# Patient Record
Sex: FEMALE | Race: WHITE | NOT HISPANIC OR LATINO
[De-identification: names, ages, dates, MRNs, and addresses within clinical notes are randomized per-mention and may not be internally consistent; named-entity substitution may affect disease eponyms.]

---

## 2017-02-22 ENCOUNTER — APPOINTMENT (OUTPATIENT)
Dept: PULMONOLOGY | Facility: CLINIC | Age: 57
End: 2017-02-22

## 2017-02-22 VITALS
HEIGHT: 66 IN | SYSTOLIC BLOOD PRESSURE: 110 MMHG | HEART RATE: 98 BPM | DIASTOLIC BLOOD PRESSURE: 80 MMHG | BODY MASS INDEX: 31.34 KG/M2 | RESPIRATION RATE: 14 BRPM | WEIGHT: 195 LBS | OXYGEN SATURATION: 98 %

## 2017-02-22 DIAGNOSIS — Z87.39 PERSONAL HISTORY OF OTHER DISEASES OF THE MUSCULOSKELETAL SYSTEM AND CONNECTIVE TISSUE: ICD-10-CM

## 2017-02-22 RX ORDER — DIAZEPAM 10 MG/1
10 TABLET ORAL
Qty: 90 | Refills: 0 | Status: ACTIVE | COMMUNITY
Start: 2016-11-23

## 2017-02-22 RX ORDER — OXYCODONE AND ACETAMINOPHEN 5; 325 MG/1; MG/1
5-325 TABLET ORAL
Qty: 16 | Refills: 0 | Status: DISCONTINUED | COMMUNITY
Start: 2016-11-09 | End: 2017-02-22

## 2017-02-22 RX ORDER — ONDANSETRON 8 MG/1
8 TABLET ORAL
Qty: 30 | Refills: 0 | Status: ACTIVE | COMMUNITY
Start: 2016-11-14

## 2017-02-22 RX ORDER — NARATRIPTAN 2.5 MG/1
2.5 TABLET, FILM COATED ORAL
Qty: 4 | Refills: 0 | Status: ACTIVE | COMMUNITY
Start: 2016-09-06

## 2017-02-22 RX ORDER — VALACYCLOVIR 500 MG/1
500 TABLET, FILM COATED ORAL
Qty: 60 | Refills: 0 | Status: ACTIVE | COMMUNITY
Start: 2016-07-25

## 2017-02-22 RX ORDER — BUDESONIDE 180 UG/1
180 AEROSOL, POWDER RESPIRATORY (INHALATION) TWICE DAILY
Qty: 3 | Refills: 1 | Status: ACTIVE | COMMUNITY
Start: 2017-02-22 | End: 1900-01-01

## 2017-02-22 RX ORDER — VERAPAMIL HYDROCHLORIDE 180 MG/1
180 TABLET ORAL
Qty: 30 | Refills: 0 | Status: ACTIVE | COMMUNITY
Start: 2017-01-25 | End: 1900-01-01

## 2017-02-22 RX ORDER — ERGOCALCIFEROL 1.25 MG/1
1.25 MG CAPSULE, LIQUID FILLED ORAL
Qty: 4 | Refills: 0 | Status: ACTIVE | COMMUNITY
Start: 2016-09-23

## 2017-02-22 RX ORDER — MUPIROCIN 20 MG/G
2 OINTMENT TOPICAL
Qty: 22 | Refills: 0 | Status: ACTIVE | COMMUNITY
Start: 2016-12-01

## 2017-02-22 RX ORDER — LISINOPRIL 10 MG/1
10 TABLET ORAL
Qty: 30 | Refills: 0 | Status: ACTIVE | COMMUNITY
Start: 2016-09-28

## 2017-02-22 RX ORDER — CEPHALEXIN 500 MG/1
500 CAPSULE ORAL
Qty: 28 | Refills: 0 | Status: DISCONTINUED | COMMUNITY
Start: 2016-11-23 | End: 2017-02-22

## 2017-02-22 RX ORDER — HYDROXYCHLOROQUINE SULFATE 200 MG/1
200 TABLET, FILM COATED ORAL
Qty: 60 | Refills: 0 | Status: ACTIVE | COMMUNITY
Start: 2017-01-25

## 2017-02-22 RX ORDER — VALACYCLOVIR 1 G/1
1 TABLET, FILM COATED ORAL
Qty: 28 | Refills: 0 | Status: ACTIVE | COMMUNITY
Start: 2016-12-16

## 2017-03-02 ENCOUNTER — TRANSCRIPTION ENCOUNTER (OUTPATIENT)
Age: 57
End: 2017-03-02

## 2017-03-20 ENCOUNTER — MEDICATION RENEWAL (OUTPATIENT)
Age: 57
End: 2017-03-20

## 2017-04-24 ENCOUNTER — APPOINTMENT (OUTPATIENT)
Dept: PULMONOLOGY | Facility: CLINIC | Age: 57
End: 2017-04-24

## 2017-04-24 VITALS
WEIGHT: 195 LBS | BODY MASS INDEX: 31.34 KG/M2 | HEART RATE: 75 BPM | HEIGHT: 66 IN | DIASTOLIC BLOOD PRESSURE: 70 MMHG | SYSTOLIC BLOOD PRESSURE: 110 MMHG | RESPIRATION RATE: 14 BRPM | OXYGEN SATURATION: 99 %

## 2017-04-24 DIAGNOSIS — Z87.09 PERSONAL HISTORY OF OTHER DISEASES OF THE RESPIRATORY SYSTEM: ICD-10-CM

## 2017-04-24 RX ORDER — SUCRALFATE 1 G/1
1 TABLET ORAL
Qty: 120 | Refills: 3 | Status: ACTIVE | COMMUNITY
Start: 2017-04-24 | End: 1900-01-01

## 2017-07-11 ENCOUNTER — APPOINTMENT (OUTPATIENT)
Dept: PULMONOLOGY | Facility: CLINIC | Age: 57
End: 2017-07-11

## 2017-07-11 VITALS
WEIGHT: 195 LBS | RESPIRATION RATE: 17 BRPM | HEART RATE: 93 BPM | SYSTOLIC BLOOD PRESSURE: 120 MMHG | HEIGHT: 66 IN | BODY MASS INDEX: 31.34 KG/M2 | OXYGEN SATURATION: 98 % | DIASTOLIC BLOOD PRESSURE: 75 MMHG

## 2017-07-11 RX ORDER — DULOXETINE HYDROCHLORIDE 60 MG/1
60 CAPSULE, DELAYED RELEASE PELLETS ORAL
Qty: 30 | Refills: 0 | Status: ACTIVE | COMMUNITY
Start: 2017-04-04

## 2017-07-11 RX ORDER — CEFDINIR 300 MG/1
300 CAPSULE ORAL
Qty: 20 | Refills: 0 | Status: DISCONTINUED | COMMUNITY
Start: 2017-02-22 | End: 2017-07-11

## 2017-07-11 RX ORDER — DEXTROAMPHETAMINE SACCHARATE, AMPHETAMINE ASPARTATE, DEXTROAMPHETAMINE SULFATE AND AMPHETAMINE SULFATE 5; 5; 5; 5 MG/1; MG/1; MG/1; MG/1
20 TABLET ORAL
Qty: 60 | Refills: 0 | Status: ACTIVE | COMMUNITY
Start: 2017-03-28

## 2017-07-11 RX ORDER — CLONAZEPAM 1 MG/1
1 TABLET ORAL
Qty: 60 | Refills: 0 | Status: DISCONTINUED | COMMUNITY
Start: 2017-01-25 | End: 2017-07-11

## 2017-07-11 RX ORDER — HYDROCODONE BITARTRATE AND ACETAMINOPHEN 5; 325 MG/1; MG/1
5-325 TABLET ORAL
Qty: 18 | Refills: 0 | Status: ACTIVE | COMMUNITY
Start: 2017-04-24

## 2017-07-11 RX ORDER — BUTALBITAL, ACETAMINOPHEN AND CAFFEINE 50; 325; 40 MG/1; MG/1; MG/1
50-325-40 CAPSULE ORAL
Qty: 180 | Refills: 0 | Status: ACTIVE | COMMUNITY
Start: 2017-03-07

## 2017-07-11 RX ORDER — CLONAZEPAM 2 MG/1
2 TABLET ORAL
Qty: 60 | Refills: 0 | Status: ACTIVE | COMMUNITY
Start: 2017-03-28

## 2017-07-24 ENCOUNTER — APPOINTMENT (OUTPATIENT)
Dept: PULMONOLOGY | Facility: CLINIC | Age: 57
End: 2017-07-24

## 2017-07-24 VITALS
HEART RATE: 82 BPM | OXYGEN SATURATION: 100 % | RESPIRATION RATE: 17 BRPM | BODY MASS INDEX: 32.14 KG/M2 | WEIGHT: 200 LBS | HEIGHT: 66 IN | SYSTOLIC BLOOD PRESSURE: 104 MMHG | DIASTOLIC BLOOD PRESSURE: 70 MMHG

## 2017-07-24 RX ORDER — HALOBETASOL PROPIONATE 0.5 MG/G
0.05 OINTMENT TOPICAL
Qty: 15 | Refills: 0 | Status: ACTIVE | COMMUNITY
Start: 2017-07-21

## 2017-08-15 ENCOUNTER — RX RENEWAL (OUTPATIENT)
Age: 57
End: 2017-08-15

## 2017-09-11 ENCOUNTER — MEDICATION RENEWAL (OUTPATIENT)
Age: 57
End: 2017-09-11

## 2017-09-12 ENCOUNTER — APPOINTMENT (OUTPATIENT)
Dept: PULMONOLOGY | Facility: CLINIC | Age: 57
End: 2017-09-12

## 2018-03-03 ENCOUNTER — RX RENEWAL (OUTPATIENT)
Age: 58
End: 2018-03-03

## 2018-05-06 ENCOUNTER — TRANSCRIPTION ENCOUNTER (OUTPATIENT)
Age: 58
End: 2018-05-06

## 2018-06-01 ENCOUNTER — TRANSCRIPTION ENCOUNTER (OUTPATIENT)
Age: 58
End: 2018-06-01

## 2018-07-10 ENCOUNTER — APPOINTMENT (OUTPATIENT)
Dept: PULMONOLOGY | Facility: CLINIC | Age: 58
End: 2018-07-10
Payer: COMMERCIAL

## 2018-07-10 VITALS
BODY MASS INDEX: 32.14 KG/M2 | RESPIRATION RATE: 15 BRPM | OXYGEN SATURATION: 95 % | SYSTOLIC BLOOD PRESSURE: 110 MMHG | DIASTOLIC BLOOD PRESSURE: 74 MMHG | WEIGHT: 200 LBS | HEART RATE: 101 BPM | HEIGHT: 66 IN

## 2018-07-10 PROCEDURE — 94729 DIFFUSING CAPACITY: CPT

## 2018-07-10 PROCEDURE — 99214 OFFICE O/P EST MOD 30 MIN: CPT | Mod: 25

## 2018-07-10 PROCEDURE — 94010 BREATHING CAPACITY TEST: CPT

## 2018-07-10 RX ORDER — AMOXICILLIN AND CLAVULANATE POTASSIUM 500; 125 MG/1; MG/1
500-125 TABLET, FILM COATED ORAL
Qty: 30 | Refills: 0 | Status: DISCONTINUED | COMMUNITY
Start: 2018-02-28

## 2018-07-10 RX ORDER — TRAMADOL HYDROCHLORIDE 50 MG/1
50 TABLET, COATED ORAL
Qty: 40 | Refills: 0 | Status: ACTIVE | COMMUNITY
Start: 2018-01-23

## 2018-07-10 RX ORDER — ONDANSETRON 4 MG/1
4 TABLET, ORALLY DISINTEGRATING ORAL
Qty: 40 | Refills: 0 | Status: ACTIVE | COMMUNITY
Start: 2018-01-16

## 2018-07-10 RX ORDER — ONDANSETRON 8 MG/1
8 TABLET, ORALLY DISINTEGRATING ORAL
Qty: 30 | Refills: 0 | Status: ACTIVE | COMMUNITY
Start: 2018-04-13

## 2018-07-10 RX ORDER — ERYTHROMYCIN 5 MG/G
5 OINTMENT OPHTHALMIC
Qty: 4 | Refills: 0 | Status: DISCONTINUED | COMMUNITY
Start: 2018-05-06

## 2018-07-10 RX ORDER — GABAPENTIN 300 MG/1
300 CAPSULE ORAL
Qty: 90 | Refills: 0 | Status: ACTIVE | COMMUNITY
Start: 2018-06-08

## 2018-08-15 ENCOUNTER — MEDICATION RENEWAL (OUTPATIENT)
Age: 58
End: 2018-08-15

## 2018-09-22 ENCOUNTER — RX RENEWAL (OUTPATIENT)
Age: 58
End: 2018-09-22

## 2018-10-21 ENCOUNTER — TRANSCRIPTION ENCOUNTER (OUTPATIENT)
Age: 58
End: 2018-10-21

## 2018-10-30 ENCOUNTER — NON-APPOINTMENT (OUTPATIENT)
Age: 58
End: 2018-10-30

## 2018-10-30 ENCOUNTER — APPOINTMENT (OUTPATIENT)
Dept: PULMONOLOGY | Facility: CLINIC | Age: 58
End: 2018-10-30
Payer: COMMERCIAL

## 2018-10-30 VITALS
DIASTOLIC BLOOD PRESSURE: 70 MMHG | OXYGEN SATURATION: 97 % | HEART RATE: 100 BPM | SYSTOLIC BLOOD PRESSURE: 120 MMHG | BODY MASS INDEX: 32.14 KG/M2 | RESPIRATION RATE: 17 BRPM | HEIGHT: 66 IN | WEIGHT: 200 LBS

## 2018-10-30 PROCEDURE — 94010 BREATHING CAPACITY TEST: CPT

## 2018-10-30 PROCEDURE — 99214 OFFICE O/P EST MOD 30 MIN: CPT | Mod: 25

## 2018-11-07 ENCOUNTER — APPOINTMENT (OUTPATIENT)
Dept: PULMONOLOGY | Facility: CLINIC | Age: 58
End: 2018-11-07
Payer: COMMERCIAL

## 2018-11-07 ENCOUNTER — NON-APPOINTMENT (OUTPATIENT)
Age: 58
End: 2018-11-07

## 2018-11-07 VITALS
DIASTOLIC BLOOD PRESSURE: 62 MMHG | WEIGHT: 212 LBS | BODY MASS INDEX: 34.07 KG/M2 | OXYGEN SATURATION: 95 % | RESPIRATION RATE: 17 BRPM | HEIGHT: 66 IN | SYSTOLIC BLOOD PRESSURE: 114 MMHG | HEART RATE: 88 BPM

## 2018-11-07 PROCEDURE — 94010 BREATHING CAPACITY TEST: CPT

## 2018-11-07 PROCEDURE — 99214 OFFICE O/P EST MOD 30 MIN: CPT | Mod: 25

## 2018-11-07 NOTE — PROCEDURE
[FreeTextEntry1] : PFT - spi reveals normal flows; FEV1 is 3.11 which is 110% of predicted, normal flow volume loop

## 2018-11-07 NOTE — HISTORY OF PRESENT ILLNESS
[FreeTextEntry1] : Ms. Dickinson is a 57 year old female presenting to the office for a follow up visit for asthma, allergies, GERD, JOSHUA, PNA history, poor sleep, postnasal drip, sicca, sleep disordered breathing, and shortness of breath. Her chief complaint is her ankle pain.\par -she states that she has been experiencing an residual cough\par -she reports some dizziness after coughing\par -she notes she has a slight sour taste in the mouth\par -she complains of active heartburn since her last illness\par -she denies any recent wheezing \par -she experiences SOB after exercising. She notes that her ankle is swollen and is going to get  it evaluated\par -she states that her mouth still has dryness\par -she reports that she takes Gabapentin for her pain instead of Percocets \par -she denies any headaches, nausea, vomiting, fever, chills, sweats, chest pain, chest pressure, diarrhea, constipation, dysphagia, leg swelling, leg pain, itchy eyes, itchy ears.

## 2018-11-07 NOTE — ADDENDUM
[FreeTextEntry1] : \par All medical record entries made by nguyen Viramontes were at Dr. Len Gonzalez's, direction and personally dictated by me on (11/7/2018). I have reviewed the chart and agree that the record accurately reflects my personal performance of the history, physical exam, assessment and plan. I have also personally directed, reviewed, and agree with the discharge instructions.

## 2018-11-07 NOTE — REASON FOR VISIT
[Follow-Up] : a follow-up visit [FreeTextEntry1] : asthma, allergies, GERD, JOSHUA, PNA history, poor sleep, postnasal drip, sicca, sleep disordered breathing, and shortness of breath

## 2018-11-07 NOTE — ASSESSMENT
[FreeTextEntry1] : Ms. Dickinson has a h/o asthma, allergy, GERS, OSAS, s/p PNA. She is overall improved.\par \par Her shortness of breath is multifactorial due to:\par -obesity\par -out of shape\par -asthma\par -? CAD\par -poor breathing mechanics\par \par Problem 1: PNA\par -finished Doxycycline 100 BID x 7mg\par \par problem 1: asthma-\par -continue to use Singulair 10 mg before bed\par -continue Xopenex 1.25 with the nebulizer TID\par -Continue Asmanex inhaler 2 inhalations BID\par - Continue  Bevespi 2 puffs BID \par -Asthma is believed to be caused by inherited (genetic) and environmental factor, but its exact cause is unknown. Asthma may be triggered by allergens, lung infections, or irritants in the air. Asthma triggers are different for each person\par -Inhaler technique reviewed as well as oral hygiene techniques reviewed with patient. Avoidance of cold air, extremes of temperature, rescue inhaler should be used before exercise. Order of medication reviewed with patient. Recommended use of a cool mist humidifier in the bedroom. \par \par problem 2: allergic rhinitis \par - Recommended Navage saline \par -continue to use nasal saline\par -followed by Flonase 1 sniff each nostril BID \par -followed by Olopatadine 1 sniff each nostril\par -can use Boroleum ointment PRN\par -X-lear QD PRN \par -Environmental measures for allergies were encouraged including mattress and pillow cover, air purifier, and environmental controls.\par \par problem 3: GERD\par -continue to use Dexilant 60 mg BID (before meals)\par -continue Pepcid 40 mg before bed \par -restart use of Carafate PRN\par -discussed Rule of 2's; pt should avoid eating too much; too fast; too spicy; too lousy; less than two hours before bed \par -Things to avoid including overeating, spicy foods, tight clothing, eating within three hours of bed, this list is not all inclusive. \par -For treatment of reflux, possible options discussed including diet control, H2 blockers, PPIs, as well as coating motility agents discussed as treatment options. Timing of meals and proximity of last meal to sleep were discussed. If symptoms persist, a formal gastrointestinal evaluation is needed.\par \par problem 4: obesity\par -Weight loss, exercise, and diet control were discussed and are highly encouraged. Treatment options were given such as, aqua therapy, and contacting a nutritionist. Recommended to use the elliptical, stationary bike, less use of treadmill. Obesity is associated with worsening asthma, shortness of breath, and potential for cardiac disease, diabetes, and other underlying medical conditions. \par \par problem 5: mild JOSHUA\par -her recent sleep study found mild OSAS- recommended a Dental Device, the names Dr. Roblero is recommended for her based on her location \par -Sleep apnea is associated with adverse clinical consequences which an affect most organ systems. Cardiovascular disease risk includes arrhythmias, atrial fibrillation, hypertension, coronary artery disease, and stroke. Metabolic disorders include diabetes type 2, non-alcoholic fatty liver disease. Mood disorder especially depression; and cognitive decline especially in the elderly. Associations with chronic reflux/Chavis’s esophagus some but not all inclusive.\par -Reasons include arousal consistent with hypopnea; respiratory events most prominent in REM sleep or supine position; therefore sleep staging and body position are important for accurate diagnosis and estimation of AHI.\par -Treatment options discussed including CPAP/BiPAP machine, oral appliance, ProVent therapy, Oxy-Aid by Respitec, new technologies, or positional sleep.Recommended use of the CPAP machine for moderate (AHI >15), moderate to severe (AHI 15-30) and severe patients (AHI > 30). Recommended weight loss which can reduce AHI especially in weight loss of greater than 5% of BMI. Positional sleep is recommended in those with low AHI, low-moderate MBI, and younger age. For severe sleep apnea, the hypoglossal nerve stimulator was recommended as well. \par \par problem 6: poor sleep\par - Recommended either Dr. Herbie Roblero or Dr. Yamila Albarran for a dental device. \par -recommending Sleep Guard\par -recommending pt wear sunglasses 30min before bed\par -Good sleep hygiene was encouraged including avoiding watching television an hour before bed, keeping caffeine at a low, avoiding reading, television, or anything, in bed, no drinking any liquids three hours before bedtime, and only getting into bed when tired and ready for sleep\par \par problem 7: sicca\par -recommended to use Mouth Kote \par \par Problem 8:health maintenance \par - Recommendd Functional medicine follow up with Dr. Sejal Myrick\par -s/p flu shot \par -recommended strep pneumonia vaccines: Prevnar-13 vaccine, followed by Pneumo vaccine 23 one year following\par -recommended early intervention for URIs\par -recommended regular osteoporosis evaluations\par -recommended early dermatological evaluations\par -recommended after the age of 50 to the age of 70, colonoscopy every 5 years \par F/U in 4 months\par She is encouraged to call with any changes, concerns, or questions.

## 2019-01-28 ENCOUNTER — RX RENEWAL (OUTPATIENT)
Age: 59
End: 2019-01-28

## 2019-03-07 ENCOUNTER — APPOINTMENT (OUTPATIENT)
Dept: PULMONOLOGY | Facility: CLINIC | Age: 59
End: 2019-03-07

## 2019-05-06 ENCOUNTER — TRANSCRIPTION ENCOUNTER (OUTPATIENT)
Age: 59
End: 2019-05-06

## 2019-07-25 ENCOUNTER — APPOINTMENT (OUTPATIENT)
Dept: PULMONOLOGY | Facility: CLINIC | Age: 59
End: 2019-07-25
Payer: COMMERCIAL

## 2019-07-25 ENCOUNTER — NON-APPOINTMENT (OUTPATIENT)
Age: 59
End: 2019-07-25

## 2019-07-25 VITALS
HEART RATE: 82 BPM | DIASTOLIC BLOOD PRESSURE: 65 MMHG | SYSTOLIC BLOOD PRESSURE: 110 MMHG | WEIGHT: 185 LBS | RESPIRATION RATE: 15 BRPM | OXYGEN SATURATION: 96 % | HEIGHT: 66 IN | BODY MASS INDEX: 29.73 KG/M2

## 2019-07-25 DIAGNOSIS — Z87.01 PERSONAL HISTORY OF PNEUMONIA (RECURRENT): ICD-10-CM

## 2019-07-25 DIAGNOSIS — H10.13 ACUTE ATOPIC CONJUNCTIVITIS, BILATERAL: ICD-10-CM

## 2019-07-25 PROCEDURE — 99214 OFFICE O/P EST MOD 30 MIN: CPT | Mod: 25

## 2019-07-25 PROCEDURE — 94010 BREATHING CAPACITY TEST: CPT

## 2019-11-25 ENCOUNTER — RX RENEWAL (OUTPATIENT)
Age: 59
End: 2019-11-25

## 2019-11-25 NOTE — PROCEDURE
[FreeTextEntry1] : PFT revealed normal flows, with a FEV1 of 3.30L, which is 118% of predicted, with a normal flow volume loop

## 2019-11-25 NOTE — ASSESSMENT
[FreeTextEntry1] : Ms. Dickinson has a h/o asthma, allergy, GERS, OSAS, s/p PNA, SLE / Sicca. She is overall improved.\par \par Her shortness of breath is multifactorial due to:\par -obesity\par -out of shape\par -asthma\par -? CAD\par -poor breathing mechanics\par \par problem 1: asthma-\par -continue to use Singulair 10 mg before bed\par -continue Xopenex 1.25 with the nebulizer TID\par -Add Trelegy 1 inhalation QD\par -Asthma is believed to be caused by inherited (genetic) and environmental factor, but its exact cause is unknown. Asthma may be triggered by allergens, lung infections, or irritants in the air. Asthma triggers are different for each person\par -Inhaler technique reviewed as well as oral hygiene techniques reviewed with patient. Avoidance of cold air, extremes of temperature, rescue inhaler should be used before exercise. Order of medication reviewed with patient. Recommended use of a cool mist humidifier in the bedroom. \par \par problem 2: allergic rhinitis \par - Recommended Navage saline \par -continue to use nasal saline\par -followed by Flonase 1 sniff each nostril BID \par -followed by Olopatadine 1 sniff each nostril\par -can use Boroleum ointment PRN\par -X-lear QD PRN \par -Environmental measures for allergies were encouraged including mattress and pillow cover, air purifier, and environmental controls.\par \par problem 3: GERD\par -continue to use Dexilant 60 mg BID (before meals)\par -continue Pepcid 40 mg before bed \par -restart use of Carafate PRN\par -discussed Rule of 2's; pt should avoid eating too much; too fast; too spicy; too lousy; less than two hours before bed \par -Things to avoid including overeating, spicy foods, tight clothing, eating within three hours of bed, this list is not all inclusive. \par -For treatment of reflux, possible options discussed including diet control, H2 blockers, PPIs, as well as coating motility agents discussed as treatment options. Timing of meals and proximity of last meal to sleep were discussed. If symptoms persist, a formal gastrointestinal evaluation is needed.\par \par problem 4: obesity (improved)\par -Weight loss, exercise, and diet control were discussed and are highly encouraged. Treatment options were given such as, aqua therapy, and contacting a nutritionist. Recommended to use the elliptical, stationary bike, less use of treadmill. Obesity is associated with worsening asthma, shortness of breath, and potential for cardiac disease, diabetes, and other underlying medical conditions. \par \par problem 5: mild JOSHUA\par -her recent sleep study found mild OSAS- recommended a Dental Device, the names Dr. Roblero is recommended for her based on her location \par -Sleep apnea is associated with adverse clinical consequences which an affect most organ systems. Cardiovascular disease risk includes arrhythmias, atrial fibrillation, hypertension, coronary artery disease, and stroke. Metabolic disorders include diabetes type 2, non-alcoholic fatty liver disease. Mood disorder especially depression; and cognitive decline especially in the elderly. Associations with chronic reflux/Chavis’s esophagus some but not all inclusive.\par -Reasons include arousal consistent with hypopnea; respiratory events most prominent in REM sleep or supine position; therefore sleep staging and body position are important for accurate diagnosis and estimation of AHI.\par -Treatment options discussed including CPAP/BiPAP machine, oral appliance, ProVent therapy, Oxy-Aid by Respitec, new technologies, or positional sleep.Recommended use of the CPAP machine for moderate (AHI >15), moderate to severe (AHI 15-30) and severe patients (AHI > 30). Recommended weight loss which can reduce AHI especially in weight loss of greater than 5% of BMI. Positional sleep is recommended in those with low AHI, low-moderate MBI, and younger age. For severe sleep apnea, the hypoglossal nerve stimulator was recommended as well. \par \par problem 6: poor sleep\par - Recommended either Dr. Herbie Roblero or Dr. Yamila Albarran for a dental device. \par -recommending Sleep Guard\par -recommending pt wear sunglasses 30min before bed\par -Good sleep hygiene was encouraged including avoiding watching television an hour before bed, keeping caffeine at a low, avoiding reading, television, or anything, in bed, no drinking any liquids three hours before bedtime, and only getting into bed when tired and ready for sleep\par \par problem 7: sicca\par -recommended to use Mouth Kote / Evoxac 30 mg QAM\par \par Problem 8:health maintenance \par - Recommended Functional medicine follow up with Dr. Svitlana Myrick\par -s/p flu shot \par -recommended strep pneumonia vaccines: Prevnar-13 vaccine, followed by Pneumo vaccine 23 one year following\par -recommended early intervention for URIs\par -recommended regular osteoporosis evaluations\par -recommended early dermatological evaluations\par -recommended after the age of 50 to the age of 70, colonoscopy every 5 years \par F/U in 4 months\par She is encouraged to call with any changes, concerns, or questions.

## 2019-11-25 NOTE — HISTORY OF PRESENT ILLNESS
[FreeTextEntry1] : Ms. Dickinson is a 58 year old female presenting to the office for a follow up visit for asthma, allergies, GERD, JOSHUA, PNA history, poor sleep, postnasal drip, sicca, sleep disordered breathing, and shortness of breath. Her chief complaint is SOB\par -she reports she was feeling well until the weather increased dramatically last week\par -she notes she hasn’t been breathing well, and needs to use her inhalers more often\par -she reports heavy breathing and a need to clear her throat due to post nasal drip onset 2 days ago\par -she notes she has SOB at night, and coughs up until she uses Xopenex\par -she reports not having a cold and doesn’t feel sick\par -she notes she has difficulty initiating sleep, though is able to maintain sleep well\par -she reports having lost 30 pounds\par -she reports her heartburn is controlled heavily\par -she reports feeling a nauseous sensation / reflux for the past few weeks and has been using Reglan for it, for which she doesn’t know the cause of this reflux\par -she reports she hasn’t been snoring\par -she notes her mouth is still dry\par -\par -she denies any chest pain, chest pressure, diarrhea, constipation, dysphagia, dizziness, sour taste in the mouth

## 2019-11-25 NOTE — ADDENDUM
[FreeTextEntry1] : Documented by Jasen Lemus acting as a scribe for Dr. Len Gonzalez on 07/25/2019.\par \par All medical record entries made by the Scribe were at my, Dr. Len Gonzalez's, direction and personally dictated by me on 07/25/2019. I have reviewed the chart and agree that the record accurately reflects my personal performance of the history, physical exam, assessment and plan. I have also personally directed, reviewed, and agree with the discharge instructions.

## 2019-11-25 NOTE — REVIEW OF SYSTEMS
[Negative] : Pulmonary Hypertension [Recent Wt Loss (___ Lbs)] : recent [unfilled] ~Ulb weight loss [Postnasal Drip] : postnasal drip [Cough] : cough [Sputum] : sputum  [Dry Mouth] : dry mouth [Dyspnea] : dyspnea [As Noted in HPI] : as noted in HPI [Difficulty Initiating Sleep] : difficulty falling asleep [Difficulty Maintaining Sleep] : no difficulty maintaining sleep [Snoring] : no snoring

## 2019-11-26 ENCOUNTER — TRANSCRIPTION ENCOUNTER (OUTPATIENT)
Age: 59
End: 2019-11-26

## 2020-01-21 ENCOUNTER — RX RENEWAL (OUTPATIENT)
Age: 60
End: 2020-01-21

## 2020-03-11 ENCOUNTER — APPOINTMENT (OUTPATIENT)
Dept: PULMONOLOGY | Facility: CLINIC | Age: 60
End: 2020-03-11

## 2020-03-22 ENCOUNTER — TRANSCRIPTION ENCOUNTER (OUTPATIENT)
Age: 60
End: 2020-03-22

## 2020-03-24 ENCOUNTER — RX RENEWAL (OUTPATIENT)
Age: 60
End: 2020-03-24

## 2020-03-29 ENCOUNTER — RX RENEWAL (OUTPATIENT)
Age: 60
End: 2020-03-29

## 2020-07-08 ENCOUNTER — APPOINTMENT (OUTPATIENT)
Dept: PULMONOLOGY | Facility: CLINIC | Age: 60
End: 2020-07-08
Payer: COMMERCIAL

## 2020-07-08 PROCEDURE — 99214 OFFICE O/P EST MOD 30 MIN: CPT | Mod: 95

## 2020-07-08 RX ORDER — RANITIDINE 300 MG/1
300 TABLET ORAL
Qty: 1 | Refills: 1 | Status: DISCONTINUED | COMMUNITY
Start: 2017-02-22 | End: 2020-07-08

## 2020-07-08 NOTE — REASON FOR VISIT
[Follow-Up] : a follow-up visit [FreeTextEntry1] : video - asthma, allergies, GERD, JOSHUA, PNA history, poor sleep, postnasal drip, sicca, sleep disordered breathing, and shortness of breath

## 2020-07-08 NOTE — ASSESSMENT
[FreeTextEntry1] : Ms. Dickinson is 58 yo has a h/o asthma, iron deficiency anemia,  allergy, GERS, OSAS, s/p PNA, SLE / Sicca. She video calls into the office for a pulmonary follow up. She is symptomatic due to asthma/ anemia.\par \par Her shortness of breath is multifactorial due to:\par -obesity\par -out of shape\par -asthma\par -? CAD\par -poor breathing mechanics (Anemia)\par \par problem 1: asthma\par -continue to use Singulair 10 mg before bed\par -continue Xopenex 1.25 with the nebulizer TID\par -Add Ventolin rescue inhaler 2 inhalations before exercise, Q6H \par -add Symbicort (160) 2 inhalations BID\par -Asthma is believed to be caused by inherited (genetic) and environmental factor, but its exact cause is unknown. Asthma may be triggered by allergens, lung infections, or irritants in the air. Asthma triggers are different for each person\par -Inhaler technique reviewed as well as oral hygiene techniques reviewed with patient. Avoidance of cold air, extremes of temperature, rescue inhaler should be used before exercise. Order of medication reviewed with patient. Recommended use of a cool mist humidifier in the bedroom. \par \par problem 1A: Fe Anemia\par -f/u Dr. Boyce for infusion\par \par problem 2: allergic rhinitis \par -add Astelin (.15) 1 sniff each nostril BID \par - Recommended Navage saline \par -continue to use nasal saline\par -followed by Flonase 1 sniff each nostril BID \par -followed by Olopatadine 1 sniff each nostril\par -can use Boroleum ointment PRN\par -X-lear QD PRN \par -Environmental measures for allergies were encouraged including mattress and pillow cover, air purifier, and environmental controls.\par \par problem 3: GERD\par -continue to use Dexilant 60 mg BID (before meals)\par -continue Pepcid 40 mg before bed \par -restart use of Carafate PRN\par -discussed Rule of 2's; pt should avoid eating too much; too fast; too spicy; too lousy; less than two hours before bed \par -Things to avoid including overeating, spicy foods, tight clothing, eating within three hours of bed, this list is not all inclusive. \par -For treatment of reflux, possible options discussed including diet control, H2 blockers, PPIs, as well as coating motility agents discussed as treatment options. Timing of meals and proximity of last meal to sleep were discussed. If symptoms persist, a formal gastrointestinal evaluation is needed.\par \par problem 4: obesity (improved)\par -Weight loss, exercise, and diet control were discussed and are highly encouraged. Treatment options were given such as, aqua therapy, and contacting a nutritionist. Recommended to use the elliptical, stationary bike, less use of treadmill. Obesity is associated with worsening asthma, shortness of breath, and potential for cardiac disease, diabetes, and other underlying medical conditions. \par \par problem 5: mild JOSHUA\par -her recent sleep study found mild OSAS- recommended a Dental Device, the names Dr. Roblero is recommended for her based on her location \par -Sleep apnea is associated with adverse clinical consequences which an affect most organ systems. Cardiovascular disease risk includes arrhythmias, atrial fibrillation, hypertension, coronary artery disease, and stroke. Metabolic disorders include diabetes type 2, non-alcoholic fatty liver disease. Mood disorder especially depression; and cognitive decline especially in the elderly. Associations with chronic reflux/Chavis’s esophagus some but not all inclusive.\par -Reasons include arousal consistent with hypopnea; respiratory events most prominent in REM sleep or supine position; therefore sleep staging and body position are important for accurate diagnosis and estimation of AHI.\par -Treatment options discussed including CPAP/BiPAP machine, oral appliance, ProVent therapy, Oxy-Aid by [employer]pitec, new technologies, or positional sleep.Recommended use of the CPAP machine for moderate (AHI >15), moderate to severe (AHI 15-30) and severe patients (AHI > 30). Recommended weight loss which can reduce AHI especially in weight loss of greater than 5% of BMI. Positional sleep is recommended in those with low AHI, low-moderate MBI, and younger age. For severe sleep apnea, the hypoglossal nerve stimulator was recommended as well. \par \par problem 6: poor sleep\par - Recommended either Dr. Herbie Roblero or Dr. Yamila Albarran for a dental device. \par -recommending Sleep Guard\par -recommending pt wear sunglasses 30min before bed\par -Good sleep hygiene was encouraged including avoiding watching television an hour before bed, keeping caffeine at a low, avoiding reading, television, or anything, in bed, no drinking any liquids three hours before bedtime, and only getting into bed when tired and ready for sleep\par \par problem 7: sicca\par -recommended to use Mouth Kote / Evoxac 30 mg QAM\par \par Problem 8: Health Maintenance/COVID19 Precautions \par - Clean your hands often. Wash your hands often with soap and water for at least 20 seconds, especially after blowing your nose, coughing, or sneezing, or having been in a public place.\par - If soap and water are not available, use a hand  that contains at least 60% alcohol.\par - To the extent possible, avoid touching high-touch surfaces in public places - elevator buttons, door handles, handrails, handshaking with people, etc. Use a tissue or your sleeve to cover your hand or finger if you must touch something.\par - Wash your hands after touching surfaces in public places.\par - Avoid touching your face, nose, eyes, etc.\par - Clean and disinfect your home to remove germs: practice routine cleaning of frequently touched surfaces (for example: tables, doorknobs, light switches, handles, desks, toilets, faucets, sinks & cell phones)\par - Avoid crowds, especially in poorly ventilated spaces. Your risk of exposure to respiratory viruses like COVID-19 may increase in crowded, closed-in settings with little air circulation if there are people in the crowd who are sick. All patients are recommended to practice social distancing and stay at least 6 feet away from others. \par - Avoid all non-essential travel including plane trips, and especially avoid embarking on cruise ships.\par -If COVID-19 is spreading in your community, take extra measures to put distance between yourself and other people to further reduce your risk of being exposed to this new virus.\par -Stay home as much as possible.\par - Consider ways of getting food brought to your house through family, social, or commercial networks\par -Be aware that the virus has been known to live in the air up to 3 hours post exposure, cardboard up to 24 hours post exposure, copper up to 4 hours post exposure, steel and plastic up to 2-3 days post exposure. Risk of transmission from these surfaces are affected by many variables.\par COVID-19 precautionary Immune Support Recommendations:\par -OTC Vitamin C 500mg BID \par -OTC Quercetin 250-500mg BID \par -OTC Zinc 75-100mg per day \par -OTC Melatonin 1 or 2mg a night \par -OTC Vitamin D 1-4000mg per day \par -OTC Tonic Water 8oz per day\par -Water 0.5-1 gallon per day\par Asthma and COVID19:\par You need to make sure your asthma is under control. This often requires the use of inhaled corticosteroids (and sometimes oral corticosteroids). Inhaled corticosteroids do not likely reduce your immune system’s ability to fight infections, but oral corticosteroids may. It is important to use the steps above to protect yourself to limit your exposure to any respiratory virus. \par \par Problem 9:health maintenance \par - Recommended Functional medicine follow up with Dr. Svitlana Myrick\par -s/p flu shot \par -recommended strep pneumonia vaccines: Prevnar-13 vaccine, followed by Pneumo vaccine 23 one year following\par -recommended early intervention for URIs\par -recommended regular osteoporosis evaluations\par -recommended early dermatological evaluations\par -recommended after the age of 50 to the age of 70, colonoscopy every 5 years \par F/U in 4 months\par She is encouraged to call with any changes, concerns, or questions.

## 2020-07-08 NOTE — ADDENDUM
[FreeTextEntry1] : Documented by Romero Flores acting as a scribe for Dr. Len Gonzalez on 07/08/2020.\par \par All medical record entries made by the Scribe were at my, Dr. Len Gonzalez's, direction and personally dictated by me on 07/08/2020. I have reviewed the chart and agree that the record accurately reflects my personal performance of the history, physical exam, assessment and plan. I have also personally directed, reviewed, and agree with the discharge instructions.

## 2020-07-08 NOTE — PHYSICAL EXAM
[No Acute Distress] : no acute distress [Well Nourished] : well nourished [Normal Appearance] : normal appearance [Well Groomed] : well groomed [Well Developed] : well developed [No Deformities] : no deformities

## 2020-07-08 NOTE — HISTORY OF PRESENT ILLNESS
[Home] : at home, [unfilled] , at the time of the visit. [Verbal consent obtained from patient] : the patient, [unfilled] [Medical Office: ([facility])___] : at the medical office located in  [FreeTextEntry1] : Ms. Dickinson is a 58 year old female video calling to the office for a follow up visit via video for asthma, allergies, GERD, JOSHUA, PNA history, poor sleep, postnasal drip, sicca, sleep disordered breathing, and shortness of breath. Her chief complaint is \par \par -she notes feeling poorly due to lowered iron levels\par -she notes general body weakness and heavy sensation\par -she notes mild difficulty and discomfort breathing\par -she notes compliance with emergency inhaler\par -she notes loss of 35 lbs, but recent gain of 30 back due to inactivity\par -she denies frequent exercise.\par -she notes mild leaky, drippy, and congested sinuses exacerbated by heat and humidity\par -she notes daily Singular use\par \par -today she denies any headaches, nausea, vomiting, fever, chills, sweats, chest pain, chest pressure, diarrhea, constipation, dysphagia, dizziness, leg swelling, leg pain, itchy eyes, itchy ears, heartburn, reflux, or sour taste in the mouth.

## 2020-07-21 ENCOUNTER — APPOINTMENT (OUTPATIENT)
Dept: OBGYN | Facility: CLINIC | Age: 60
End: 2020-07-21

## 2020-08-14 ENCOUNTER — RX RENEWAL (OUTPATIENT)
Age: 60
End: 2020-08-14

## 2020-11-01 ENCOUNTER — TRANSCRIPTION ENCOUNTER (OUTPATIENT)
Age: 60
End: 2020-11-01

## 2021-02-22 ENCOUNTER — RX RENEWAL (OUTPATIENT)
Age: 61
End: 2021-02-22

## 2021-05-05 ENCOUNTER — NON-APPOINTMENT (OUTPATIENT)
Age: 61
End: 2021-05-05

## 2021-05-05 ENCOUNTER — APPOINTMENT (OUTPATIENT)
Dept: PULMONOLOGY | Facility: CLINIC | Age: 61
End: 2021-05-05
Payer: COMMERCIAL

## 2021-05-05 VITALS
HEIGHT: 64 IN | TEMPERATURE: 97.8 F | DIASTOLIC BLOOD PRESSURE: 70 MMHG | SYSTOLIC BLOOD PRESSURE: 110 MMHG | OXYGEN SATURATION: 97 % | RESPIRATION RATE: 16 BRPM | WEIGHT: 219 LBS | HEART RATE: 95 BPM | BODY MASS INDEX: 37.39 KG/M2

## 2021-05-05 DIAGNOSIS — Z01.812 ENCOUNTER FOR PREPROCEDURAL LABORATORY EXAMINATION: ICD-10-CM

## 2021-05-05 PROCEDURE — 94010 BREATHING CAPACITY TEST: CPT

## 2021-05-05 PROCEDURE — 95012 NITRIC OXIDE EXP GAS DETER: CPT

## 2021-05-05 PROCEDURE — 94618 PULMONARY STRESS TESTING: CPT

## 2021-05-05 PROCEDURE — 99072 ADDL SUPL MATRL&STAF TM PHE: CPT

## 2021-05-05 PROCEDURE — 99214 OFFICE O/P EST MOD 30 MIN: CPT | Mod: 25

## 2021-05-05 NOTE — ASSESSMENT
[FreeTextEntry1] : Ms. Dickinson is 59 yo has a h/o asthma, iron deficiency anemia,  allergy, GERS, OSAS, s/p PNA, SLE / Sicca. She presents to the office for a pulmonary follow up. She is frustrated by weight / sleep - fatigue\par \par Her shortness of breath is multifactorial due to:\par -obesity\par -out of shape\par -asthma\par -? CAD\par -poor breathing mechanics (Anemia)\par \par problem 1: asthma\par -continue to use Singulair 10 mg before bed\par -continue Xopenex 1.25 with the nebulizer TID\par -continue Ventolin rescue inhaler 2 inhalations before exercise, Q6H \par -continue Symbicort (160) 2 inhalations BID\par -Asthma is believed to be caused by inherited (genetic) and environmental factor, but its exact cause is unknown. Asthma may be triggered by allergens, lung infections, or irritants in the air. Asthma triggers are different for each person\par -Inhaler technique reviewed as well as oral hygiene techniques reviewed with patient. Avoidance of cold air, extremes of temperature, rescue inhaler should be used before exercise. Order of medication reviewed with patient. Recommended use of a cool mist humidifier in the bedroom. \par \par problem 1A: Fe Anemia - PRN\par -f/u Dr. Boyce for infusion\par \par problem 2: allergic rhinitis \par -add Astelin (.15) 1 sniff each nostril BID \par - Recommended Navage saline \par -continue to use nasal saline\par -followed by Flonase 1 sniff each nostril BID \par -followed by Olopatadine 1 sniff each nostril\par -can use Boroleum ointment PRN\par -X-lear QD PRN \par -Environmental measures for allergies were encouraged including mattress and pillow cover, air purifier, and environmental controls.\par \par problem 3: GERD\par -continue to use Dexilant 60 mg BID (before meals)\par -continue Pepcid 40 mg before bed \par -restart use of Carafate PRN\par -discussed Rule of 2's; pt should avoid eating too much; too fast; too spicy; too lousy; less than two hours before bed \par -Things to avoid including overeating, spicy foods, tight clothing, eating within three hours of bed, this list is not all inclusive. \par -For treatment of reflux, possible options discussed including diet control, H2 blockers, PPIs, as well as coating motility agents discussed as treatment options. Timing of meals and proximity of last meal to sleep were discussed. If symptoms persist, a formal gastrointestinal evaluation is needed.\par \par problem 4: obesity (improved)\par -Weight loss, exercise, and diet control were discussed and are highly encouraged. Treatment options were given such as, aqua therapy, and contacting a nutritionist. Recommended to use the elliptical, stationary bike, less use of treadmill. Obesity is associated with worsening asthma, shortness of breath, and potential for cardiac disease, diabetes, and other underlying medical conditions. \par \par problem 5: mild JOSHUA\par -repeat HSS\par -her recent sleep study found mild OSAS- recommended a Dental Device, the names Dr. Roblero is recommended for her based on her location \par -Sleep apnea is associated with adverse clinical consequences which an affect most organ systems. Cardiovascular disease risk includes arrhythmias, atrial fibrillation, hypertension, coronary artery disease, and stroke. Metabolic disorders include diabetes type 2, non-alcoholic fatty liver disease. Mood disorder especially depression; and cognitive decline especially in the elderly. Associations with chronic reflux/Chavis’s esophagus some but not all inclusive.\par -Reasons include arousal consistent with hypopnea; respiratory events most prominent in REM sleep or supine position; therefore sleep staging and body position are important for accurate diagnosis and estimation of AHI.\par -Treatment options discussed including CPAP/BiPAP machine, oral appliance, ProVent therapy, Oxy-Aid by Respitec, new technologies, or positional sleep.Recommended use of the CPAP machine for moderate (AHI >15), moderate to severe (AHI 15-30) and severe patients (AHI > 30). Recommended weight loss which can reduce AHI especially in weight loss of greater than 5% of BMI. Positional sleep is recommended in those with low AHI, low-moderate MBI, and younger age. For severe sleep apnea, the hypoglossal nerve stimulator was recommended as well. \par \par problem 6: poor sleep\par - Recommended either Dr. Herbie Roblero or Dr. Yamila Albarran for a dental device. \par -recommending Sleep Guard\par -recommending pt wear sunglasses 30min before bed\par -Good sleep hygiene was encouraged including avoiding watching television an hour before bed, keeping caffeine at a low, avoiding reading, television, or anything, in bed, no drinking any liquids three hours before bedtime, and only getting into bed when tired and ready for sleep\par \par problem 7: sicca\par -recommended to use Mouth Kote / Evoxac 30 mg QAM\par \par Problem 8: Health Maintenance/COVID19 Precautions \par -s/p Covid-19 vaccine Pfizer x2\par \par - Clean your hands often. Wash your hands often with soap and water for at least 20 seconds, especially after blowing your nose, coughing, or sneezing, or having been in a public place.\par - If soap and water are not available, use a hand  that contains at least 60% alcohol.\par - To the extent possible, avoid touching high-touch surfaces in public places - elevator buttons, door handles, handrails, handshaking with people, etc. Use a tissue or your sleeve to cover your hand or finger if you must touch something.\par - Wash your hands after touching surfaces in public places.\par - Avoid touching your face, nose, eyes, etc.\par - Clean and disinfect your home to remove germs: practice routine cleaning of frequently touched surfaces (for example: tables, doorknobs, light switches, handles, desks, toilets, faucets, sinks & cell phones)\par - Avoid crowds, especially in poorly ventilated spaces. Your risk of exposure to respiratory viruses like COVID-19 may increase in crowded, closed-in settings with little air circulation if there are people in the crowd who are sick. All patients are recommended to practice social distancing and stay at least 6 feet away from others. \par - Avoid all non-essential travel including plane trips, and especially avoid embarking on cruise ships.\par -If COVID-19 is spreading in your community, take extra measures to put distance between yourself and other people to further reduce your risk of being exposed to this new virus.\par -Stay home as much as possible.\par - Consider ways of getting food brought to your house through family, social, or commercial networks\par -Be aware that the virus has been known to live in the air up to 3 hours post exposure, cardboard up to 24 hours post exposure, copper up to 4 hours post exposure, steel and plastic up to 2-3 days post exposure. Risk of transmission from these surfaces are affected by many variables.\par COVID-19 precautionary Immune Support Recommendations:\par -OTC Vitamin C 500mg BID \par -OTC Quercetin 250-500mg BID \par -OTC Zinc 75-100mg per day \par -OTC Melatonin 1 or 2mg a night \par -OTC Vitamin D 1-4000mg per day \par -OTC Tonic Water 8oz per day\par -Water 0.5-1 gallon per day\par Asthma and COVID19:\par You need to make sure your asthma is under control. This often requires the use of inhaled corticosteroids (and sometimes oral corticosteroids). Inhaled corticosteroids do not likely reduce your immune system’s ability to fight infections, but oral corticosteroids may. It is important to use the steps above to protect yourself to limit your exposure to any respiratory virus. \par \par Problem 9:health maintenance \par - Recommended Functional medicine follow up with Dr. Sejal Myrick\par -s/p flu shot \par -recommended strep pneumonia vaccines: Prevnar-13 vaccine, followed by Pneumo vaccine 23 one year following\par -recommended early intervention for URIs\par -recommended regular osteoporosis evaluations\par -recommended early dermatological evaluations\par -recommended after the age of 50 to the age of 70, colonoscopy every 5 years \par F/U in 4 months\par She is encouraged to call with any changes, concerns, or questions.

## 2021-05-05 NOTE — ADDENDUM
[FreeTextEntry1] : Documented by Jasen Lemus acting as a scribe for Dr. Len Gonzalez on 05/05/2021.\par \par All medical record entries made by the Scribe were at my, Dr. Len Gonzalez's, direction and personally dictated by me on 05/05/2021. I have reviewed the chart and agree that the record accurately reflects my personal performance of the history, physical exam, assessment and plan. I have also personally directed, reviewed, and agree with the discharge instructions.

## 2021-05-05 NOTE — HISTORY OF PRESENT ILLNESS
[FreeTextEntry1] : Ms. Dickinson is a 60 year old female presenting to the office for a follow up visit via video for asthma, allergies, GERD, JOSHUA, PNA history, poor sleep, postnasal drip, sicca, sleep disordered breathing, and shortness of breath. Her chief complaint is her weight\par -She reports feeling generally well\par -she is s/p the Pfizer vaccine 3/2021 with no rxns\par -she gets infusions with a rheumatologist (Thalia). She took an antibody test and she has antibodies\par -she notes she is constantly dizzy and nauseated\par -she states she is sleeping well, getting 10 hours nightly\par -she reports she has a very occasional cough\par -she reports she she returned to weight watchers following [place]\par \par -she denies any wheezing, chest pain, chest pressure, diarrhea, constipation, dysphagia, dizziness, sour taste in the mouth, heartburn, reflux, myalgias or arthralgias.

## 2021-05-05 NOTE — PROCEDURE
[FreeTextEntry1] : PFT revealed normal flows, with a FEV1 of 3.14L, which is 123% of predicted, with a normal flow volume loop\par \par FENO was 8; a normal value being less than 25\par Fractional exhaled nitric oxide (FENO) is regarded as a simple, noninvasive method for assessing eosinophilic airway inflammation. Produced by a variety of cells within the lung, nitric oxide (NO) concentrations are generally low in healthy individuals. However, high concentrations of NO appear to be involved in nonspecific host defense mechanisms and chronic inflammatory diseases such as asthma. The American Thoracic Society (ATS) therefore has recommended using FENO to aid in the diagnosis and monitoring of eosinophilic airway inflammation and asthma, and for identifying steroid responsive individuals whose chronic respiratory symptoms may be caused by airway inflammation. \par \par 6 minute walk test reveals a low saturation of 96% with mild dyspnea; walked 440.1 meters

## 2021-05-20 ENCOUNTER — RX RENEWAL (OUTPATIENT)
Age: 61
End: 2021-05-20

## 2021-06-19 ENCOUNTER — RX RENEWAL (OUTPATIENT)
Age: 61
End: 2021-06-19

## 2021-08-17 ENCOUNTER — RX RENEWAL (OUTPATIENT)
Age: 61
End: 2021-08-17

## 2021-08-31 PROBLEM — Z01.812 PRE-PROCEDURAL LABORATORY EXAMINATION: Status: ACTIVE | Noted: 2021-08-31

## 2021-09-12 ENCOUNTER — RX RENEWAL (OUTPATIENT)
Age: 61
End: 2021-09-12

## 2021-10-10 ENCOUNTER — RX RENEWAL (OUTPATIENT)
Age: 61
End: 2021-10-10

## 2021-12-13 ENCOUNTER — TRANSCRIPTION ENCOUNTER (OUTPATIENT)
Age: 61
End: 2021-12-13

## 2021-12-16 ENCOUNTER — NON-APPOINTMENT (OUTPATIENT)
Age: 61
End: 2021-12-16

## 2021-12-16 ENCOUNTER — APPOINTMENT (OUTPATIENT)
Dept: PULMONOLOGY | Facility: CLINIC | Age: 61
End: 2021-12-16
Payer: COMMERCIAL

## 2021-12-16 VITALS
RESPIRATION RATE: 16 BRPM | DIASTOLIC BLOOD PRESSURE: 70 MMHG | OXYGEN SATURATION: 98 % | HEART RATE: 97 BPM | SYSTOLIC BLOOD PRESSURE: 120 MMHG | WEIGHT: 218 LBS | HEIGHT: 64 IN | TEMPERATURE: 97.7 F | BODY MASS INDEX: 37.22 KG/M2

## 2021-12-16 PROCEDURE — 99214 OFFICE O/P EST MOD 30 MIN: CPT | Mod: 25

## 2021-12-16 PROCEDURE — 95012 NITRIC OXIDE EXP GAS DETER: CPT

## 2021-12-16 PROCEDURE — 94727 GAS DIL/WSHOT DETER LNG VOL: CPT

## 2021-12-16 PROCEDURE — ZZZZZ: CPT

## 2021-12-16 PROCEDURE — 94729 DIFFUSING CAPACITY: CPT

## 2021-12-16 PROCEDURE — 94010 BREATHING CAPACITY TEST: CPT

## 2021-12-16 NOTE — PROCEDURE
[FreeTextEntry1] : Feno was 72; a normal value being less than 25. Fractional exhaled nitric oxide (FENO) is regarded as a simple, noninvasive method for assessing eosinophilic airway inflammation. Produced by a variety of cells within the lung, nitric oxide (NO) concentrations are generally low in healthy individuals. However, high concentrations of NO appear to be involved in nonspecific host defense mechanisms and chronic inflammatory  diseases such as asthma. The American Thoracic Society (ATS) therefore recommended using FENO to aid in the diagnosis and monitoring of eosinophilic airway inflammation and asthma, and for identifying steroid responsive individuals whose chronic respiratory symptoms may be caused by airway inflammation \par \par Full PFT revealed normal flows, with a FEV1 of 3.19L, which is 127% of predicted, normal lung volumes, and a diffusion of 26, which is 99% of predicted, with a normal flow volume loop \par \par Sleep study (7/11/2017) revealed sleep apnea with an AHI/DAMIR of 9.8, snore index of 52% and a low oxygen saturation of 82%\par \par -Images and procedures reviewed in detail and discussed with patient.

## 2021-12-16 NOTE — PHYSICAL EXAM
[No Acute Distress] : no acute distress [Well Nourished] : well nourished [Normal Appearance] : normal appearance [Well Groomed] : well groomed [No Deformities] : no deformities [Well Developed] : well developed [III] : Mallampati Class: III [TextBox_2] : OW [TextBox_68] : I:E 1:3; Clear

## 2021-12-16 NOTE — ASSESSMENT
[FreeTextEntry1] : Ms. Dickinson is 59 yo has a h/o asthma, iron deficiency anemia,  allergy, GERS, OSAS, s/p PNA, SLE / Sicca. She presents to the office for a pulmonary follow up. She is frustrated by weight / sleep - fatigue (snoring) - ?Dx of SLE \par \par Her shortness of breath is multifactorial due to:\par -obesity\par -out of shape\par -asthma\par -? CAD\par -poor breathing mechanics \par -(Anemia)\par \par problem 1: asthma\par -continue to use Singulair 10 mg before bed\par -continue Xopenex 1.25 with the nebulizer TID\par -continue Ventolin rescue inhaler 2 inhalations before exercise, Q6H \par -continue Symbicort (160) 2 inhalations BID\par -Asthma is believed to be caused by inherited (genetic) and environmental factor, but its exact cause is unknown. Asthma may be triggered by allergens, lung infections, or irritants in the air. Asthma triggers are different for each person\par -Inhaler technique reviewed as well as oral hygiene techniques reviewed with patient. Avoidance of cold air, extremes of temperature, rescue inhaler should be used before exercise. Order of medication reviewed with patient. Recommended use of a cool mist humidifier in the bedroom. \par \par problem 1A: Fe Anemia - PRN\par -f/u Dr. Boyce for infusion\par \par problem 2: allergic rhinitis \par -continue Astelin (.15) 1 sniff each nostril BID \par - Recommended Navage saline \par -continue to use nasal saline\par -followed by Flonase 1 sniff each nostril BID \par -followed by Olopatadine 1 sniff each nostril (0.6)\par -can use Boroleum ointment PRN\par -X-lear QD PRN \par -Environmental measures for allergies were encouraged including mattress and pillow cover, air purifier, and environmental controls.\par \par problem 3: GERD\par -continue to use Dexilant 60 mg BID (before meals)\par -continue Pepcid 40 mg before bed \par -restart use of Carafate PRN\par -discussed Rule of 2's; pt should avoid eating too much; too fast; too spicy; too lousy; less than two hours before bed \par -Things to avoid including overeating, spicy foods, tight clothing, eating within three hours of bed, this list is not all inclusive. \par -For treatment of reflux, possible options discussed including diet control, H2 blockers, PPIs, as well as coating motility agents discussed as treatment options. Timing of meals and proximity of last meal to sleep were discussed. If symptoms persist, a formal gastrointestinal evaluation is needed.\par \par problem 4: obesity (improved)\par -Recommend "Muniq" OTC Supplement for weight loss, energy levels, and blood sugar levels \par -Weight loss, exercise, and diet control were discussed and are highly encouraged. Treatment options were given such as, aqua therapy, and contacting a nutritionist. Recommended to use the elliptical, stationary bike, less use of treadmill. Obesity is associated with worsening asthma, shortness of breath, and potential for cardiac disease, diabetes, and other underlying medical conditions. \par \par problem 5: mild JOSHUA\par -repeat HSS - move to HSS\par -her recent sleep study found mild OSAS- recommended a Dental Device, the names Dr. Roblero is recommended for her based on her location \par -Sleep apnea is associated with adverse clinical consequences which an affect most organ systems. Cardiovascular disease risk includes arrhythmias, atrial fibrillation, hypertension, coronary artery disease, and stroke. Metabolic disorders include diabetes type 2, non-alcoholic fatty liver disease. Mood disorder especially depression; and cognitive decline especially in the elderly. Associations with chronic reflux/Chavis’s esophagus some but not all inclusive.\par -Reasons include arousal consistent with hypopnea; respiratory events most prominent in REM sleep or supine position; therefore sleep staging and body position are important for accurate diagnosis and estimation of AHI.\par -Treatment options discussed including CPAP/BiPAP machine, oral appliance, ProVent therapy, Oxy-Aid by Respitec, new technologies, or positional sleep.Recommended use of the CPAP machine for moderate (AHI >15), moderate to severe (AHI 15-30) and severe patients (AHI > 30). Recommended weight loss which can reduce AHI especially in weight loss of greater than 5% of BMI. Positional sleep is recommended in those with low AHI, low-moderate MBI, and younger age. For severe sleep apnea, the hypoglossal nerve stimulator was recommended as well. \par \par problem 6: poor sleep\par - Recommended either Dr. Herbie Roblero or Dr. Yamila Albarran for a dental device. \par -recommending Sleep Guard\par -recommending pt wear sunglasses 30min before bed\par -Good sleep hygiene was encouraged including avoiding watching television an hour before bed, keeping caffeine at a low, avoiding reading, television, or anything, in bed, no drinking any liquids three hours before bedtime, and only getting into bed when tired and ready for sleep\par \par problem 7: sicca\par -recommended to use Mouth Kote / Evoxac 30 mg QAM\par \par Problem 8: Health Maintenance/COVID19 Precautions \par -s/p Covid-19 vaccine Pfizer x3\par - Clean your hands often. Wash your hands often with soap and water for at least 20 seconds, especially after blowing your nose, coughing, or sneezing, or having been in a public place.\par - If soap and water are not available, use a hand  that contains at least 60% alcohol.\par - To the extent possible, avoid touching high-touch surfaces in public places - elevator buttons, door handles, handrails, handshaking with people, etc. Use a tissue or your sleeve to cover your hand or finger if you must touch something.\par - Wash your hands after touching surfaces in public places.\par - Avoid touching your face, nose, eyes, etc.\par - Clean and disinfect your home to remove germs: practice routine cleaning of frequently touched surfaces (for example: tables, doorknobs, light switches, handles, desks, toilets, faucets, sinks & cell phones)\par - Avoid crowds, especially in poorly ventilated spaces. Your risk of exposure to respiratory viruses like COVID-19 may increase in crowded, closed-in settings with little air circulation if there are people in the crowd who are sick. All patients are recommended to practice social distancing and stay at least 6 feet away from others. \par - Avoid all non-essential travel including plane trips, and especially avoid embarking on cruise ships.\par -If COVID-19 is spreading in your community, take extra measures to put distance between yourself and other people to further reduce your risk of being exposed to this new virus.\par -Stay home as much as possible.\par - Consider ways of getting food brought to your house through family, social, or commercial networks\par -Be aware that the virus has been known to live in the air up to 3 hours post exposure, cardboard up to 24 hours post exposure, copper up to 4 hours post exposure, steel and plastic up to 2-3 days post exposure. Risk of transmission from these surfaces are affected by many variables.\par COVID-19 precautionary Immune Support Recommendations:\par -OTC Vitamin C 500mg BID \par -OTC Quercetin 250-500mg BID \par -OTC Zinc 75-100mg per day \par -OTC Melatonin 1 or 2mg a night \par -OTC Vitamin D 1-4000mg per day \par -OTC Tonic Water 8oz per day\par -Water 0.5-1 gallon per day\par Asthma and COVID19:\par You need to make sure your asthma is under control. This often requires the use of inhaled corticosteroids (and sometimes oral corticosteroids). Inhaled corticosteroids do not likely reduce your immune system’s ability to fight infections, but oral corticosteroids may. It is important to use the steps above to protect yourself to limit your exposure to any respiratory virus. \par \par Problem 9:health maintenance \par -Recommended Functional medicine follow up with Dr. Sejal Myrick\par -s/p flu shot 2021\par -recommended strep pneumonia vaccines: Prevnar-13 vaccine, followed by Pneumo vaccine 23 one year following\par -recommended early intervention for URIs\par -recommended regular osteoporosis evaluations\par -recommended early dermatological evaluations\par -recommended after the age of 50 to the age of 70, colonoscopy every 5 years \par F/U in 4 months\par She is encouraged to call with any changes, concerns, or questions.

## 2021-12-16 NOTE — HISTORY OF PRESENT ILLNESS
[FreeTextEntry1] : Ms. Dcikinson is a 60 year old female presenting to the office for a follow up visit for asthma, allergies, GERD, JOSHUA, PNA history, poor sleep, postnasal drip, sicca, sleep disordered breathing, and shortness of breath. Her chief complaint is \par -she is doing well in general\par -She is now snoring for the past 6 months \par -she has gained weight in the past 18 months\par -she reports coughing recently, and tested negative for Covid 19. Her cough is mostly present at night\par -her heartburn and reflux are controlled \par -she notes her bowels are regular\par -Her sense of smell and taste are regular\par -She notes her energy level is variable \par -she is now starting to come off Lupus medication\par \par - patient denies any headaches, nausea, vomiting, fever, chills, sweats, chest pain, chest pressure, palpitations, coughing, wheezing, diarrhea, constipation, dysphagia, myalgias, dizziness, leg swelling, leg pain, itchy eyes, itchy ears, heartburn, reflux or sour taste in the mouth

## 2022-02-12 ENCOUNTER — RX RENEWAL (OUTPATIENT)
Age: 62
End: 2022-02-12

## 2022-04-25 ENCOUNTER — NON-APPOINTMENT (OUTPATIENT)
Age: 62
End: 2022-04-25

## 2022-04-25 ENCOUNTER — APPOINTMENT (OUTPATIENT)
Dept: PULMONOLOGY | Facility: CLINIC | Age: 62
End: 2022-04-25
Payer: COMMERCIAL

## 2022-04-25 VITALS
OXYGEN SATURATION: 97 % | SYSTOLIC BLOOD PRESSURE: 116 MMHG | RESPIRATION RATE: 16 BRPM | TEMPERATURE: 97.3 F | WEIGHT: 202 LBS | DIASTOLIC BLOOD PRESSURE: 74 MMHG | HEART RATE: 84 BPM | BODY MASS INDEX: 32.47 KG/M2 | HEIGHT: 66 IN

## 2022-04-25 PROCEDURE — 94010 BREATHING CAPACITY TEST: CPT

## 2022-04-25 PROCEDURE — 95012 NITRIC OXIDE EXP GAS DETER: CPT

## 2022-04-25 PROCEDURE — 99214 OFFICE O/P EST MOD 30 MIN: CPT | Mod: 25

## 2022-04-25 RX ORDER — OXYCODONE AND ACETAMINOPHEN 10; 325 MG/1; MG/1
10-325 TABLET ORAL
Qty: 21 | Refills: 0 | Status: ACTIVE | COMMUNITY
Start: 2022-02-25

## 2022-04-25 RX ORDER — FENOFIBRATE 145 MG/1
145 TABLET, COATED ORAL
Qty: 30 | Refills: 0 | Status: ACTIVE | COMMUNITY
Start: 2021-12-15

## 2022-04-25 RX ORDER — AMITRIPTYLINE HYDROCHLORIDE 50 MG/1
50 TABLET, FILM COATED ORAL
Qty: 30 | Refills: 0 | Status: ACTIVE | COMMUNITY
Start: 2022-04-05

## 2022-04-25 RX ORDER — ERENUMAB-AOOE 140 MG/ML
140 INJECTION, SOLUTION SUBCUTANEOUS
Qty: 1 | Refills: 0 | Status: ACTIVE | COMMUNITY
Start: 2022-03-18

## 2022-04-25 RX ORDER — CELECOXIB 200 MG/1
200 CAPSULE ORAL
Qty: 60 | Refills: 0 | Status: ACTIVE | COMMUNITY
Start: 2022-04-19

## 2022-04-25 NOTE — ASSESSMENT
[FreeTextEntry1] : Ms. Dickinson is 60 yo has a h/o asthma, iron deficiency anemia,  allergy, GERS, OSAS, s/p PNA, SLE / Sicca. She presents to the office for a pulmonary follow up. She is frustrated by weight / sleep - fatigue (snoring) - ?Dx of SLE; s/p COVID-19 vaccine x4\par \par Her shortness of breath is multifactorial due to:\par -obesity\par -out of shape\par -asthma\par -? CAD\par -poor breathing mechanics \par -(Anemia)\par \par problem 1: asthma\par -continue to use Singulair 10 mg before bed\par -continue Xopenex 1.25 with the nebulizer TID\par -continue Ventolin rescue inhaler 2 inhalations before exercise, Q6H \par -continue Symbicort (160) 2 inhalations BID\par -Asthma is believed to be caused by inherited (genetic) and environmental factor, but its exact cause is unknown. Asthma may be triggered by allergens, lung infections, or irritants in the air. Asthma triggers are different for each person\par -Inhaler technique reviewed as well as oral hygiene techniques reviewed with patient. Avoidance of cold air, extremes of temperature, rescue inhaler should be used before exercise. Order of medication reviewed with patient. Recommended use of a cool mist humidifier in the bedroom. \par \par problem 1A: Fe Anemia - PRN\par -f/u Dr. Boyce for infusion\par \par problem 2: allergic rhinitis \par -continue Astelin (.15) 1 sniff each nostril BID \par - Recommended Navage saline \par -continue to use nasal saline\par -followed by Flonase 1 sniff each nostril BID \par -followed by Olopatadine 1 sniff each nostril (0.6)\par -can use Boroleum ointment PRN\par -X-lear QD PRN \par -Environmental measures for allergies were encouraged including mattress and pillow cover, air purifier, and environmental controls.\par \par problem 3: GERD\par -continue to use Dexilant 60 mg BID (before meals)\par -continue Pepcid 40 mg before bed \par -restart use of Carafate PRN\par -discussed Rule of 2's; pt should avoid eating too much; too fast; too spicy; too lousy; less than two hours before bed \par -Things to avoid including overeating, spicy foods, tight clothing, eating within three hours of bed, this list is not all inclusive. \par -For treatment of reflux, possible options discussed including diet control, H2 blockers, PPIs, as well as coating motility agents discussed as treatment options. Timing of meals and proximity of last meal to sleep were discussed. If symptoms persist, a formal gastrointestinal evaluation is needed.\par \par problem 4: obesity (improved)\par -Recommend "Muniq" OTC Supplement for weight loss, energy levels, and blood sugar levels \par -Weight loss, exercise, and diet control were discussed and are highly encouraged. Treatment options were given such as, aqua therapy, and contacting a nutritionist. Recommended to use the elliptical, stationary bike, less use of treadmill. Obesity is associated with worsening asthma, shortness of breath, and potential for cardiac disease, diabetes, and other underlying medical conditions. \par \par problem 5: mild JOSHUA\par -repeat HSS - move to HSS\par -her recent sleep study found mild OSAS- recommended a Dental Device, the names Dr. Roblero is recommended for her based on her location \par -Sleep apnea is associated with adverse clinical consequences which an affect most organ systems. Cardiovascular disease risk includes arrhythmias, atrial fibrillation, hypertension, coronary artery disease, and stroke. Metabolic disorders include diabetes type 2, non-alcoholic fatty liver disease. Mood disorder especially depression; and cognitive decline especially in the elderly. Associations with chronic reflux/Chavis’s esophagus some but not all inclusive.\par -Reasons include arousal consistent with hypopnea; respiratory events most prominent in REM sleep or supine position; therefore sleep staging and body position are important for accurate diagnosis and estimation of AHI.\par -Treatment options discussed including CPAP/BiPAP machine, oral appliance, ProVent therapy, Oxy-Aid by Respitec, new technologies, or positional sleep.Recommended use of the CPAP machine for moderate (AHI >15), moderate to severe (AHI 15-30) and severe patients (AHI > 30). Recommended weight loss which can reduce AHI especially in weight loss of greater than 5% of BMI. Positional sleep is recommended in those with low AHI, low-moderate MBI, and younger age. For severe sleep apnea, the hypoglossal nerve stimulator was recommended as well. \par \par problem 6: poor sleep\par - Recommended either Dr. Herbie Roblero or Dr. Yamila Albarran for a dental device vs. "Excite"\par -recommending Sleep Guard\par -recommending pt wear sunglasses 30min before bed\par -Good sleep hygiene was encouraged including avoiding watching television an hour before bed, keeping caffeine at a low, avoiding reading, television, or anything, in bed, no drinking any liquids three hours before bedtime, and only getting into bed when tired and ready for sleep\par \par problem 7: sicca\par -recommended to use Mouth Kote / Evoxac 30 mg QAM\par \par Problem 8: Health Maintenance/COVID19 Precautions \par -s/p Covid-19 vaccine Pfizer x4\par - Clean your hands often. Wash your hands often with soap and water for at least 20 seconds, especially after blowing your nose, coughing, or sneezing, or having been in a public place.\par - If soap and water are not available, use a hand  that contains at least 60% alcohol.\par - To the extent possible, avoid touching high-touch surfaces in public places - elevator buttons, door handles, handrails, handshaking with people, etc. Use a tissue or your sleeve to cover your hand or finger if you must touch something.\par - Wash your hands after touching surfaces in public places.\par - Avoid touching your face, nose, eyes, etc.\par - Clean and disinfect your home to remove germs: practice routine cleaning of frequently touched surfaces (for example: tables, doorknobs, light switches, handles, desks, toilets, faucets, sinks & cell phones)\par - Avoid crowds, especially in poorly ventilated spaces. Your risk of exposure to respiratory viruses like COVID-19 may increase in crowded, closed-in settings with little air circulation if there are people in the crowd who are sick. All patients are recommended to practice social distancing and stay at least 6 feet away from others. \par - Avoid all non-essential travel including plane trips, and especially avoid embarking on cruise ships.\par -If COVID-19 is spreading in your community, take extra measures to put distance between yourself and other people to further reduce your risk of being exposed to this new virus.\par -Stay home as much as possible.\par - Consider ways of getting food brought to your house through family, social, or commercial networks\par -Be aware that the virus has been known to live in the air up to 3 hours post exposure, cardboard up to 24 hours post exposure, copper up to 4 hours post exposure, steel and plastic up to 2-3 days post exposure. Risk of transmission from these surfaces are affected by many variables.\par COVID-19 precautionary Immune Support Recommendations:\par -OTC Vitamin C 500mg BID \par -OTC Quercetin 250-500mg BID \par -OTC Zinc 75-100mg per day \par -OTC Melatonin 1 or 2mg a night \par -OTC Vitamin D 1-4000mg per day \par -OTC Tonic Water 8oz per day\par -Water 0.5-1 gallon per day\par Asthma and COVID19:\par You need to make sure your asthma is under control. This often requires the use of inhaled corticosteroids (and sometimes oral corticosteroids). Inhaled corticosteroids do not likely reduce your immune system’s ability to fight infections, but oral corticosteroids may. It is important to use the steps above to protect yourself to limit your exposure to any respiratory virus. \par \par Problem 9:health maintenance \par -Recommended Functional medicine follow up with Dr. Sejal Myrick\par -s/p flu shot 2021\par -recommended strep pneumonia vaccines: Prevnar-13 vaccine, followed by Pneumo vaccine 23 one year following\par -recommended early intervention for URIs\par -recommended regular osteoporosis evaluations\par -recommended early dermatological evaluations\par -recommended after the age of 50 to the age of 70, colonoscopy every 5 years \par F/U in 4 months\par She is encouraged to call with any changes, concerns, or questions.

## 2022-04-25 NOTE — HISTORY OF PRESENT ILLNESS
[FreeTextEntry1] : Ms. Dickinson is a 61 year old female presenting to the office for a follow up visit for asthma, allergies, GERD, JOSHUA, PNA history, poor sleep, postnasal drip, sicca, sleep disordered breathing, and shortness of breath. Her chief complaint is \par -she notes feeling okay in general\par -she notes losing 28 lbs after going back to [employer]\par -she notes she got her infusions every 3 weeks instead of every 4 because she feels better after it\par -she notes dizziness sometimes\par -she notes her bowels are regular\par -she notes her sense of smell and taste are normal\par -s/p sleep study\par -s/p COVID-19 vaccine x4\par \par -patient denies any headaches, nausea, vomiting, fever, chills, sweats, chest pain, chest pressure, palpitations, coughing, wheezing, fatigue, diarrhea, constipation, dysphagia, myalgias, leg swelling, leg pain, itchy eyes, itchy ears, heartburn, reflux or sour taste in the mouth

## 2022-04-25 NOTE — PHYSICAL EXAM
[No Acute Distress] : no acute distress [Well Nourished] : well nourished [Well Groomed] : well groomed [No Deformities] : no deformities [Well Developed] : well developed [Normal Oropharynx] : normal oropharynx [III] : Mallampati Class: III [Normal Appearance] : normal appearance [No Neck Mass] : no neck mass [Normal Rate/Rhythm] : normal rate/rhythm [Normal S1, S2] : normal s1, s2 [No Murmurs] : no murmurs [No Resp Distress] : no resp distress [Clear to Auscultation Bilaterally] : clear to auscultation bilaterally [No Abnormalities] : no abnormalities [Benign] : benign [Normal Gait] : normal gait [No Clubbing] : no clubbing [No Cyanosis] : no cyanosis [No Edema] : no edema [FROM] : FROM [Normal Color/ Pigmentation] : normal color/ pigmentation [No Focal Deficits] : no focal deficits [Oriented x3] : oriented x3 [Normal Affect] : normal affect [TextBox_2] : OW [TextBox_68] : I:E 1:3; Clear

## 2022-04-25 NOTE — ADDENDUM
[FreeTextEntry1] : Documented by Jasen Castillo acting as a scribe for Dr. Len Gonzalez on 04/25/2022\par \par All medical record entries made by the scribe were at my, Dr. Len Gonzalez's, direction and personally dictated by me on 04/25/2022. I have reviewed the chart and agree that the record accurately reflects my personal performance of the history, physical exam, assessment, and plan. I have also personally directed, reviewed, and agree with the discharge instructions.

## 2022-04-25 NOTE — PROCEDURE
[FreeTextEntry1] : FENO was less than 5; a normal value being less than 25. Fractional exhaled nitric oxide (FENO) is regarded as a simple, noninvasive method for assessing eosinophilic airway inflammation. Produced by a variety of cells within the lung, nitric oxide (NO) concentrations are generally low in healthy individuals. However, high concentrations of NO appear to be involved in nonspecific host defense mechanisms and chronic inflammatory  diseases such as asthma. The American Thoracic Society (ATS) therefore recommended using FENO to aid in the diagnosis and monitoring of eosinophilic airway inflammation and asthma, and for identifying steroid responsive individuals whose chronic respiratory symptoms may be caused by airway inflammation \par \par PFT revealed normal flows, with a FEV1 of 3.19L, which is 117% of predicted, with a normal flow volume loop

## 2022-06-30 ENCOUNTER — APPOINTMENT (OUTPATIENT)
Dept: ORTHOPEDIC SURGERY | Facility: CLINIC | Age: 62
End: 2022-06-30

## 2022-06-30 VITALS — WEIGHT: 202 LBS | BODY MASS INDEX: 32.47 KG/M2 | HEIGHT: 66 IN

## 2022-06-30 PROCEDURE — 99213 OFFICE O/P EST LOW 20 MIN: CPT | Mod: 25

## 2022-06-30 PROCEDURE — 20611 DRAIN/INJ JOINT/BURSA W/US: CPT | Mod: 50

## 2022-06-30 NOTE — PROCEDURE
[Large Joint Injection] : Large joint injection [Bilateral] : bilaterally of the [Knee] : knee [Pain] : pain [X-ray evidence of Osteoarthritis on this or prior visit] : x-ray evidence of Osteoarthritis on this or prior visit [Repeat series performed] : repeat series performed [Alcohol] : alcohol [Ethyl Chloride sprayed topically] : ethyl chloride sprayed topically [Sterile technique used] : sterile technique used [Euflexxa] : Euflexxa [#1] : series #1 [] : Patient tolerated procedure well [Call if redness, pain or fever occur] : call if redness, pain or fever occur [Apply ice for 15min out of every hour for the next 12-24 hours as tolerated] : apply ice for 15 minutes out of every hour for the next 12-24 hours as tolerated [Patient was advised to rest the joint(s) for ____ days] : patient was advised to rest the joint(s) for [unfilled] days [Previous OTC use and PT nontherapeutic] : patient has tried OTC's including aspirin, Ibuprofen, Aleve, etc or prescription NSAIDS, and/or exercises at home and/or physical therapy without satisfactory response [Patient had decreased mobility in the joint] : patient had decreased mobility in the joint [Risks, benefits, alternatives discussed / Verbal consent obtained] : the risks benefits, and alternatives have been discussed, and verbal consent was obtained [All ultrasound images have been permanently captured and stored accordingly in our picture archiving and communication system] : All ultrasound images have been permanently captured and stored accordingly in our picture archiving and communication system [Visualization of the needle and placement of injection was performed without complication] : visualization of the needle and placement of injection was performed without complication

## 2022-06-30 NOTE — PHYSICAL EXAM
[NL (0)] : extension 0 degrees [5___] : quadriceps 5[unfilled]/5 [Bilateral] : knee bilaterally [AP] : anteroposterior [Lateral] : lateral [[place]] : skyline [Moderate patellofemoral OA] : Moderate patellofemoral OA [] : no effusion [TWNoteComboBox7] : flexion 120 degrees

## 2022-06-30 NOTE — HISTORY OF PRESENT ILLNESS
[Gradual] : gradual [7] : 7 [1] : 2 [Dull/Aching] : dull/aching [Sharp] : sharp [Frequent] : frequent [Nothing helps with pain getting better] : Nothing helps with pain getting better [Stairs] : stairs [de-identified] : Patient Complaint - NI garcia ankles. Fell down stairs 1/14/18. [facility] in [place] in boot R a\par ankle. Prior ankle recon.\par 1/22/18: ORIF L joann\par 1/31/18: f/u L ankle, NWB splint, Some tingling and pain to the foot.\par 2/7/18: f/u L ankle, NWB in bivalved cast. Seen by Dr Copeland as cast was too tight. improved immediatel\par Dry skin and altered sensation to 3,4 toes. Had infusion 2/5/18. Clustrophobic\par 2/16/18: f/u L ankle, continued pain and claustrophobia in the cast\par 2/28/18: f/u L ankle, NWB in CAM boot, flying tomorrow. Noted oozing last night (was at show) No f/c. T\par 3/14/18: f/u L ankle, continued swelling. continued pain to the dorsal foot. PT/HEP. Euflexxa #1 b/l knee\par 3/23/18: Euflexxa # 2 bilateral knees\par 3/28/18: Euflexxa # 3 bilateral knees\par 4/11/18 f/u garcia knees. Had god response to injections. Foot pain/burning persists. Request med r/n\par 5/2/18 f/u L ankle EMG pos for neuropathy\par 5/16/18 f/u L foot. Pain worse after packing up in [place]\par 6/6/18 f/u L foot sl improved\par 6/20/18: f/u L foot. Here to discuss emg results. Upper studies reported nl. L foot and ankle swelling imp\par eval started Neurontin 400 qid w improved symptoms\par 10/18/18 f/u L foot and ankle. Had increased pain this am\par 11/8/18 Euflexxa #1 garcia knees\par 11/16/18: euflexxa # 2 bilateral knees\par 11/23/18: euflexxa # 3 bilateral knees\par 2/22/19: f/u L ankle, continued intermittent pain that is more medial, tingling persists. Also lateral lumps \par ankle\par 4/3/19 f/u L ankle Tingling persists. On Neurontin 600 6x/day, Cymbalta increasing 90 to 120\par 5/22/19 f/u L ankle HEP/PT Tolerating Cymbalta/Neurontin\par 1/8/20 f/u L ankle jarek medially. Burning pain persists\par 1/22/20 f/u L ankle Garcia knee Euflexxa #1\par 1/29/2020 f/u L ankle. Garcia knee Euflexxa #2\par 2/4/20 f/u LT ankle, B/L knee Euflexxa #3\par 6/10/20 f/u garcia knees Ant knee pain jarek stairs\par 7/17/20: Euflexxa #1 B/L knees\par 7/24/20 Euflexxa #2 garcia knees\par 8/4/20: Euflexxa # 3 bilateral knees. better with stairs.\par 2/10/21 f/u garcia knees, had good response to injections Requ meds\par 2/17/21 Euflexxa #1 garcia knees\par 2/23/21: euflexxa # 2 garcia knees\par 3/2/21: euflexxa #3 bilateral knees. fell on the knees last night due to neuropathy\par 10/15/21: Pt here for fu on bilateral knees. There is continued pain. Euflexxa # 1 bilateral knees\par 10/22/21: bilateral knee Euflexxa #2\par 10/29/21: bilateral knee euflexxa #3\par 2/25/22: R lateral ankle pain and swelling since 12/2022. She denies injury. [place]par Pain shoots into the lateral foot at times. She took Percocet the pain was so bad. Prior ankle fracture 2018\par 3/11/22: f/u R ankle to review the MRI. Symptoms the same.\par 3/17/22 f/u R ankle Leaving to [place] requ injection\par 6/30/22  f/uBil knees Euflexa #1; left knee worse\par \par  [] : no [de-identified] : squatting

## 2022-07-07 ENCOUNTER — APPOINTMENT (OUTPATIENT)
Dept: ORTHOPEDIC SURGERY | Facility: CLINIC | Age: 62
End: 2022-07-07

## 2022-07-07 VITALS — BODY MASS INDEX: 31.66 KG/M2 | HEIGHT: 66 IN | WEIGHT: 197 LBS

## 2022-07-07 VITALS — WEIGHT: 202 LBS | BODY MASS INDEX: 32.47 KG/M2 | HEIGHT: 66 IN

## 2022-07-07 PROCEDURE — 99212 OFFICE O/P EST SF 10 MIN: CPT | Mod: 25

## 2022-07-07 PROCEDURE — 20611 DRAIN/INJ JOINT/BURSA W/US: CPT | Mod: 50

## 2022-07-07 NOTE — PROCEDURE
[Large Joint Injection] : Large joint injection [Bilateral] : bilaterally of the [Knee] : knee [Pain] : pain [X-ray evidence of Osteoarthritis on this or prior visit] : x-ray evidence of Osteoarthritis on this or prior visit [Repeat series performed] : repeat series performed [Alcohol] : alcohol [Ethyl Chloride sprayed topically] : ethyl chloride sprayed topically [Sterile technique used] : sterile technique used [Euflexxa] : Euflexxa [#2] : series #2 [] : Patient tolerated procedure well [Call if redness, pain or fever occur] : call if redness, pain or fever occur [Apply ice for 15min out of every hour for the next 12-24 hours as tolerated] : apply ice for 15 minutes out of every hour for the next 12-24 hours as tolerated [Patient was advised to rest the joint(s) for ____ days] : patient was advised to rest the joint(s) for [unfilled] days [Previous OTC use and PT nontherapeutic] : patient has tried OTC's including aspirin, Ibuprofen, Aleve, etc or prescription NSAIDS, and/or exercises at home and/or physical therapy without satisfactory response [Patient had decreased mobility in the joint] : patient had decreased mobility in the joint [Risks, benefits, alternatives discussed / Verbal consent obtained] : the risks benefits, and alternatives have been discussed, and verbal consent was obtained [All ultrasound images have been permanently captured and stored accordingly in our picture archiving and communication system] : All ultrasound images have been permanently captured and stored accordingly in our picture archiving and communication system [Visualization of the needle and placement of injection was performed without complication] : visualization of the needle and placement of injection was performed without complication

## 2022-07-07 NOTE — PHYSICAL EXAM
no [NL (0)] : extension 0 degrees [5___] : quadriceps 5[unfilled]/5 [Bilateral] : knee bilaterally [AP] : anteroposterior [Lateral] : lateral [[place]] : skyline [Moderate patellofemoral OA] : Moderate patellofemoral OA [] : no effusion [TWNoteComboBox7] : flexion 120 degrees

## 2022-07-07 NOTE — HISTORY OF PRESENT ILLNESS
[2] : 2 [Euflexxa] : Euflexxa [1] : 2 [0] : 0 [Localized] : localized [Stabbing] : stabbing [Occasional] : occasional [Injection therapy] : injection therapy [de-identified] : Patient Complaint - NI garcia ankles. Fell down stairs 1/14/18. [facility] in [place] in boot R a\par ankle. Prior ankle recon.\par 1/22/18: ORIF L joann\par 1/31/18: f/u L ankle, NWB splint, Some tingling and pain to the foot.\par 2/7/18: f/u L ankle, NWB in bivalved cast. Seen by Dr Copeland as cast was too tight. improved immediatel\par Dry skin and altered sensation to 3,4 toes. Had infusion 2/5/18. Clustrophobic\par 2/16/18: f/u L ankle, continued pain and claustrophobia in the cast\par 2/28/18: f/u L ankle, NWB in CAM boot, flying tomorrow. Noted oozing last night (was at show) No f/c. T\par 3/14/18: f/u L ankle, continued swelling. continued pain to the dorsal foot. PT/HEP. Euflexxa #1 b/l knee\par 3/23/18: Euflexxa # 2 bilateral knees\par 3/28/18: Euflexxa # 3 bilateral knees\par 4/11/18 f/u garcia knees. Had god response to injections. Foot pain/burning persists. Request med r/n\par 5/2/18 f/u L ankle EMG pos for neuropathy\par 5/16/18 f/u L foot. Pain worse after packing up in [place]\par 6/6/18 f/u L foot sl improved\par 6/20/18: f/u L foot. Here to discuss emg results. Upper studies reported nl. L foot and ankle swelling imp\par eval started Neurontin 400 qid w improved symptoms\par 10/18/18 f/u L foot and ankle. Had increased pain this am\par 11/8/18 Euflexxa #1 garcia knees\par 11/16/18: euflexxa # 2 bilateral knees\par 11/23/18: euflexxa # 3 bilateral knees\par 2/22/19: f/u L ankle, continued intermittent pain that is more medial, tingling persists. Also lateral lumps \par ankle\par 4/3/19 f/u L ankle Tingling persists. On Neurontin 600 6x/day, Cymbalta increasing 90 to 120\par 5/22/19 f/u L ankle HEP/PT Tolerating Cymbalta/Neurontin\par 1/8/20 f/u L ankle jarek medially. Burning pain persists\par 1/22/20 f/u L ankle Garcia knee Euflexxa #1\par 1/29/2020 f/u L ankle. Garcia knee Euflexxa #2\par 2/4/20 f/u LT ankle, B/L knee Euflexxa #3\par 6/10/20 f/u garcia knees Ant knee pain jarek stairs\par 7/17/20: Euflexxa #1 B/L knees\par 7/24/20 Euflexxa #2 garcia knees\par 8/4/20: Euflexxa # 3 bilateral knees. better with stairs.\par 2/10/21 f/u garcia knees, had good response to injections Requ meds\par 2/17/21 Euflexxa #1 garcia knees\par 2/23/21: euflexxa # 2 garcia knees\par 3/2/21: euflexxa #3 bilateral knees. fell on the knees last night due to neuropathy\par 10/15/21: Pt here for fu on bilateral knees. There is continued pain. Euflexxa # 1 bilateral knees\par 10/22/21: bilateral knee Euflexxa #2\par 10/29/21: bilateral knee euflexxa #3\par 2/25/22: R lateral ankle pain and swelling since 12/2022. She denies injury. [place] \par Pain shoots into the lateral foot at times. She took Percocet the pain was so bad. Prior ankle fracture 2018\par 3/11/22: f/u R ankle to review the MRI. Symptoms the same.\par 3/17/22 f/u R ankle Leaving to [place] requ injection\par 6/30/22  f/uBil knees Euflexa #1; left knee worse\par 7/7/22  Euflexxa #2 garcia knee\par \par  [] : no [FreeTextEntry1] : carol knee  [FreeTextEntry5] :  IHSAN ISIDRO is a 61 year female who is here today for carol knee euflexxa inj # 2  [FreeTextEntry9] : euflexxa injection [de-identified] : 06/30/22 [TWNoteComboBox1] : 40%

## 2022-07-15 ENCOUNTER — APPOINTMENT (OUTPATIENT)
Dept: ORTHOPEDIC SURGERY | Facility: CLINIC | Age: 62
End: 2022-07-15

## 2022-07-15 PROCEDURE — 99212 OFFICE O/P EST SF 10 MIN: CPT | Mod: 25

## 2022-07-15 PROCEDURE — 20611 DRAIN/INJ JOINT/BURSA W/US: CPT | Mod: 50

## 2022-07-15 NOTE — PROCEDURE
[Large Joint Injection] : Large joint injection [Bilateral] : bilaterally of the [Knee] : knee [Pain] : pain [X-ray evidence of Osteoarthritis on this or prior visit] : x-ray evidence of Osteoarthritis on this or prior visit [Repeat series performed] : repeat series performed [Alcohol] : alcohol [Ethyl Chloride sprayed topically] : ethyl chloride sprayed topically [Sterile technique used] : sterile technique used [Euflexxa] : Euflexxa [#3] : series #3 [] : Patient tolerated procedure well [Call if redness, pain or fever occur] : call if redness, pain or fever occur [Apply ice for 15min out of every hour for the next 12-24 hours as tolerated] : apply ice for 15 minutes out of every hour for the next 12-24 hours as tolerated [Patient was advised to rest the joint(s) for ____ days] : patient was advised to rest the joint(s) for [unfilled] days [Previous OTC use and PT nontherapeutic] : patient has tried OTC's including aspirin, Ibuprofen, Aleve, etc or prescription NSAIDS, and/or exercises at home and/or physical therapy without satisfactory response [Patient had decreased mobility in the joint] : patient had decreased mobility in the joint [Risks, benefits, alternatives discussed / Verbal consent obtained] : the risks benefits, and alternatives have been discussed, and verbal consent was obtained [All ultrasound images have been permanently captured and stored accordingly in our picture archiving and communication system] : All ultrasound images have been permanently captured and stored accordingly in our picture archiving and communication system [Visualization of the needle and placement of injection was performed without complication] : visualization of the needle and placement of injection was performed without complication

## 2022-07-15 NOTE — HISTORY OF PRESENT ILLNESS
[1] : 2 [0] : 0 [Localized] : localized [Stabbing] : stabbing [Occasional] : occasional [Injection therapy] : injection therapy [2] : 2 [Euflexxa] : Euflexxa [de-identified] : Patient Complaint - NI garcia ankles. Fell down stairs 1/14/18. [facility]en [facility] in [place] in boot R a\par ankle. Prior ankle recon.\par 1/22/18: ORIF L joann\par 1/31/18: f/u L ankle, NWB splint, Some tingling and pain to the foot.\par 2/7/18: f/u L ankle, NWB in bivalved cast. Seen by Dr Copeland as cast was too tight. improved immediatel\par Dry skin and altered sensation to 3,4 toes. Had infusion 2/5/18. Clustrophobic\par 2/16/18: f/u L ankle, continued pain and claustrophobia in the cast\par 2/28/18: f/u L ankle, NWB in CAM boot, flying tomorrow. Noted oozing last night (was at show) No f/c. T\par 3/14/18: f/u L ankle, continued swelling. continued pain to the dorsal foot. PT/HEP. Euflexxa #1 b/l knee\par 3/23/18: Euflexxa # 2 bilateral knees\par 3/28/18: Euflexxa # 3 bilateral knees\par 4/11/18 f/u garcia knees. Had god response to injections. Foot pain/burning persists. Request med r/n\par 5/2/18 f/u L ankle EMG pos for neuropathy\par 5/16/18 f/u L foot. Pain worse after packing up in [place]\par 6/6/18 f/u L foot sl improved\par 6/20/18: f/u L foot. Here to discuss emg results. Upper studies reported nl. L foot and ankle swelling imp\par eval started Neurontin 400 qid w improved symptoms\par 10/18/18 f/u L foot and ankle. Had increased pain this am\par 11/8/18 Euflexxa #1 garcia knees\par 11/16/18: euflexxa # 2 bilateral knees\par 11/23/18: euflexxa # 3 bilateral knees\par 2/22/19: f/u L ankle, continued intermittent pain that is more medial, tingling persists. Also lateral lumps \par ankle\par 4/3/19 f/u L ankle Tingling persists. On Neurontin 600 6x/day, Cymbalta increasing 90 to 120\par 5/22/19 f/u L ankle HEP/PT Tolerating Cymbalta/Neurontin\par 1/8/20 f/u L ankle jarek medially. Burning pain persists\par 1/22/20 f/u L ankle Garcia knee Euflexxa #1\par 1/29/2020 f/u L ankle. Garcia knee Euflexxa #2\par 2/4/20 f/u LT ankle, B/L knee Euflexxa #3\par 6/10/20 f/u garcia knees Ant knee pain jarek stairs\par 7/17/20: Euflexxa #1 B/L knees\par 7/24/20 Euflexxa #2 garcia knees\par 8/4/20: Euflexxa # 3 bilateral knees. better with stairs.\par 2/10/21 f/u garcia knees, had good response to injections Requ meds\par 2/17/21 Euflexxa #1 garcia knees\par 2/23/21: euflexxa # 2 garcia knees\par 3/2/21: euflexxa #3 bilateral knees. fell on the knees last night due to neuropathy\par 10/15/21: Pt here for fu on bilateral knees. There is continued pain. Euflexxa # 1 bilateral knees\par 10/22/21: bilateral knee Euflexxa #2\par 10/29/21: bilateral knee euflexxa #3\par 2/25/22: R lateral ankle pain and swelling since 12/2022. She denies injury. [place] \par Pain shoots into the lateral foot at times. She took Percocet the pain was so bad. Prior ankle fracture 2018\par 3/11/22: f/u R ankle to review the MRI. Symptoms the same.\par 3/17/22 f/u R ankle Leaving to [place] requ injection\par 6/30/22  f/uBil knees Euflexa #1; left knee worse\par 7/7/22  Euflexxa #2 garcia knee\par 7/15/22: Euflexxa # 3 bilateral knees\par  [] : no [FreeTextEntry1] : carol knee  [FreeTextEntry5] :  IHSAN ISIDRO is a 61 year female who is here today for carol knee euflexxa inj # 3 [FreeTextEntry9] : euflexxa injection [de-identified] : 06/30/22 [TWNoteComboBox1] : 40%

## 2022-07-15 NOTE — PHYSICAL EXAM
[NL (0)] : extension 0 degrees [5___] : quadriceps 5[unfilled]/5 [Bilateral] : knee bilaterally [AP] : anteroposterior [Lateral] : lateral [[employer]] : skyline [Moderate patellofemoral OA] : Moderate patellofemoral OA [] : no effusion [TWNoteComboBox7] : flexion 120 degrees

## 2022-07-19 DIAGNOSIS — Z11.59 ENCOUNTER FOR SCREENING FOR OTHER VIRAL DISEASES: ICD-10-CM

## 2022-07-20 LAB
RAPID RVP RESULT: NOT DETECTED
SARS-COV-2 RNA PNL RESP NAA+PROBE: NOT DETECTED

## 2022-07-25 ENCOUNTER — TRANSCRIPTION ENCOUNTER (OUTPATIENT)
Age: 62
End: 2022-07-25

## 2022-08-19 ENCOUNTER — RESULT REVIEW (OUTPATIENT)
Age: 62
End: 2022-08-19

## 2022-08-19 ENCOUNTER — APPOINTMENT (OUTPATIENT)
Dept: ORTHOPEDIC SURGERY | Facility: CLINIC | Age: 62
End: 2022-08-19

## 2022-08-19 DIAGNOSIS — S80.01XA CONTUSION OF RIGHT KNEE, INITIAL ENCOUNTER: ICD-10-CM

## 2022-08-19 DIAGNOSIS — S80.211A ABRASION, RIGHT KNEE, INITIAL ENCOUNTER: ICD-10-CM

## 2022-08-19 PROCEDURE — 99203 OFFICE O/P NEW LOW 30 MIN: CPT

## 2022-08-19 PROCEDURE — 99213 OFFICE O/P EST LOW 20 MIN: CPT

## 2022-08-19 PROCEDURE — 73562 X-RAY EXAM OF KNEE 3: CPT | Mod: RT

## 2022-08-19 RX ORDER — OXYCODONE AND ACETAMINOPHEN 10; 325 MG/1; MG/1
10-325 TABLET ORAL
Qty: 21 | Refills: 0 | Status: ACTIVE | COMMUNITY
Start: 2022-07-07 | End: 1900-01-01

## 2022-08-19 RX ORDER — ONDANSETRON 4 MG/1
4 TABLET, ORALLY DISINTEGRATING ORAL
Qty: 20 | Refills: 0 | Status: ACTIVE | COMMUNITY
Start: 2022-08-19 | End: 1900-01-01

## 2022-08-19 NOTE — HISTORY OF PRESENT ILLNESS
[Right Leg] : right leg [Gradual] : gradual [Sudden] : sudden [5] : 5 [4] : 4 [Burning] : burning [Shooting] : shooting [Stabbing] : stabbing [Intermittent] : intermittent [Retired] : Work status: retired [de-identified] : Patient Complaint - NI garcia ankles. Fell down stairs 1/14/18. [facility] in [place] in boot R a\par ankle. Prior ankle recon.\par 1/22/18: ORIF L joann\par 1/31/18: f/u L ankle, NWB splint, Some tingling and pain to the foot.\par 2/7/18: f/u L ankle, NWB in bivalved cast. Seen by Dr Copeland as cast was too tight. improved immediatel\par Dry skin and altered sensation to 3,4 toes. Had infusion 2/5/18. Clustrophobic\par 2/16/18: f/u L ankle, continued pain and claustrophobia in the cast\par 2/28/18: f/u L ankle, NWB in CAM boot, flying tomorrow. Noted oozing last night (was at show) No f/c. T\par 3/14/18: f/u L ankle, continued swelling. continued pain to the dorsal foot. PT/HEP. Euflexxa #1 b/l knee\par 3/23/18: Euflexxa # 2 bilateral knees\par 3/28/18: Euflexxa # 3 bilateral knees\par 4/11/18 f/u garcia knees. Had god response to injections. Foot pain/burning persists. Request med r/n\par 5/2/18 f/u L ankle EMG pos for neuropathy\par 5/16/18 f/u L foot. Pain worse after packing up in [place]\par 6/6/18 f/u L foot sl improved\par 6/20/18: f/u L foot. Here to discuss emg results. Upper studies reported nl. L foot and ankle swelling imp\par eval started Neurontin 400 qid w improved symptoms\par 10/18/18 f/u L foot and ankle. Had increased pain this am\par 11/8/18 Euflexxa #1 garcia knees\par 11/16/18: euflexxa # 2 bilateral knees\par 11/23/18: euflexxa # 3 bilateral knees\par 2/22/19: f/u L ankle, continued intermittent pain that is more medial, tingling persists. Also lateral lumps \par ankle\par 4/3/19 f/u L ankle Tingling persists. On Neurontin 600 6x/day, Cymbalta increasing 90 to 120\par 5/22/19 f/u L ankle HEP/PT Tolerating Cymbalta/Neurontin\par 1/8/20 f/u L ankle jarek medially. Burning pain persists\par 1/22/20 f/u L ankle Garcia knee Euflexxa #1\par 1/29/2020 f/u L ankle. Garcia knee Euflexxa #2\par 2/4/20 f/u LT ankle, B/L knee Euflexxa #3\par 6/10/20 f/u garcia knees Ant knee pain jarek stairs\par 7/17/20: Euflexxa #1 B/L knees\par 7/24/20 Euflexxa #2 garcia knees\par 8/4/20: Euflexxa # 3 bilateral knees. better with stairs.\par 2/10/21 f/u garcia knees, had good response to injections Requ meds\par 2/17/21 Euflexxa #1 garcia knees\par 2/23/21: euflexxa # 2 garcia knees\par 3/2/21: euflexxa #3 bilateral knees. fell on the knees last night due to neuropathy\par 10/15/21: Pt here for fu on bilateral knees. There is continued pain. Euflexxa # 1 bilateral knees\par 10/22/21: bilateral knee Euflexxa #2\par 10/29/21: bilateral knee euflexxa #3\par 2/25/22: R lateral ankle pain and swelling since 12/2022. She denies injury. [place] \par Pain shoots into the lateral foot at times. She took Percocet the pain was so bad. Prior ankle fracture 2018\par 3/11/22: f/u R ankle to review the MRI. Symptoms the same.\par 3/17/22 f/u R ankle Leaving to [place] requ injection\par 6/30/22  f/uBil knees Euflexa #1; left knee worse\par 7/7/22  Euflexxa #2 garcia knee\par 7/15/22: Euflexxa # 3 bilateral knees\par \par 8/19/22: NI R knee twisting injury and fall on 8/17/22. Swelling and pain to the medial and anterior aspect of the knee. Pain with weightbearing. \par  [] : Post Surgical Visit: no [FreeTextEntry1] : right knee  [FreeTextEntry5] : Pt recently tripped and fell on to her knee and felt a sudden onset of pain that has not gone away, pt is also having swelling in the area  [de-identified] : None  [de-identified] : 7/15/22

## 2022-08-19 NOTE — PHYSICAL EXAM
[Right] : right knee [NL (0)] : extension 0 degrees [5___] : quadriceps 5[unfilled]/5 [Bilateral] : knee bilaterally [AP] : anteroposterior [Lateral] : lateral [[place]] : skyline [Moderate patellofemoral OA] : Moderate patellofemoral OA [] : negative Valgus instability [FreeTextEntry3] : anterior knee abrasion, superficial, no signs of infection  [TWNoteComboBox7] : flexion 110 degrees

## 2022-08-19 NOTE — IMAGING
[Right] : right knee [AP] : anteroposterior [Lateral] : lateral [[place]] : skyline [There are no fractures, subluxations or dislocations. No significant abnormalities are seen] : There are no fractures, subluxations or dislocations. No significant abnormalities are seen

## 2022-08-19 NOTE — DISCUSSION/SUMMARY
[de-identified] : MRI R knee STAT r/o MMT vs occult fracture medial tibial plateau\par Rx for percocet and zofran\par hinged knee brace\par f/u after MRI for review

## 2022-08-25 ENCOUNTER — APPOINTMENT (OUTPATIENT)
Dept: PULMONOLOGY | Facility: CLINIC | Age: 62
End: 2022-08-25

## 2022-08-25 VITALS
OXYGEN SATURATION: 98 % | TEMPERATURE: 97.4 F | RESPIRATION RATE: 16 BRPM | SYSTOLIC BLOOD PRESSURE: 120 MMHG | HEIGHT: 66 IN | HEART RATE: 93 BPM | DIASTOLIC BLOOD PRESSURE: 70 MMHG | WEIGHT: 192 LBS | BODY MASS INDEX: 30.86 KG/M2

## 2022-08-25 DIAGNOSIS — G47.30 SLEEP APNEA, UNSPECIFIED: ICD-10-CM

## 2022-08-25 PROCEDURE — 99214 OFFICE O/P EST MOD 30 MIN: CPT | Mod: 25

## 2022-08-25 PROCEDURE — 94010 BREATHING CAPACITY TEST: CPT

## 2022-08-25 PROCEDURE — 95012 NITRIC OXIDE EXP GAS DETER: CPT

## 2022-08-25 PROCEDURE — 94729 DIFFUSING CAPACITY: CPT

## 2022-08-25 PROCEDURE — 94727 GAS DIL/WSHOT DETER LNG VOL: CPT

## 2022-08-25 PROCEDURE — ZZZZZ: CPT

## 2022-08-25 NOTE — PROCEDURE
[FreeTextEntry1] : Feno was 9; a normal value being less than 25. Fractional exhaled nitric oxide (FENO) is regarded as a simple, noninvasive method for assessing eosinophilic airway inflammation. Produced by a variety of cells within the lung, nitric oxide (NO) concentrations are generally low in healthy individuals. However, high concentrations of NO appear to be involved in nonspecific host defense mechanisms and chronic inflammatory  diseases such as asthma. The American Thoracic Society (ATS) therefore recommended using FENO to aid in the diagnosis and monitoring of eosinophilic airway inflammation and asthma, and for identifying steroid responsive individuals whose chronic respiratory symptoms may be caused by airway inflammation \par \par Full PFT revealed normal flows, with a FEV1 of 3.40L, which is 131% of predicted, normal lung volumes, and a diffusion of 26.3, which is 109% of predicted, with a normal flow volume loop

## 2022-08-25 NOTE — PHYSICAL EXAM
[No Acute Distress] : no acute distress [Well Nourished] : well nourished [Well Groomed] : well groomed [No Deformities] : no deformities [Well Developed] : well developed [Normal Oropharynx] : normal oropharynx [Normal Appearance] : normal appearance [No Neck Mass] : no neck mass [Normal Rate/Rhythm] : normal rate/rhythm [Normal S1, S2] : normal s1, s2 [No Murmurs] : no murmurs [No Resp Distress] : no resp distress [Clear to Auscultation Bilaterally] : clear to auscultation bilaterally [No Abnormalities] : no abnormalities [Benign] : benign [Normal Gait] : normal gait [No Clubbing] : no clubbing [No Cyanosis] : no cyanosis [No Edema] : no edema [FROM] : FROM [Normal Color/ Pigmentation] : normal color/ pigmentation [No Focal Deficits] : no focal deficits [Oriented x3] : oriented x3 [Normal Affect] : normal affect [II] : Mallampati Class: II [TextBox_68] : I:E 1:3; expiratory wheezes bilaterally

## 2022-08-25 NOTE — HISTORY OF PRESENT ILLNESS
[FreeTextEntry1] : Ms. Dickinson is a 61 year old female presenting to the office for a follow up visit for asthma, allergies, GERD, JOSHUA, PNA history, poor sleep, postnasal drip, sicca, sleep disordered breathing, and shortness of breath. Her chief complaint is \par -she notes coughing for 4 months\par -she notes that her lupus has been worse\par -she notes she cannot take a deep breath without coughing\par -she notes a dry mouth\par -she notes her bowels are regular \par -she denies any visual issues   \par -she denies exercising \par -she notes joint aches with lupus\par -she notes intermittent hoarseness\par -she notes itchy eyes and ears\par -she notes she is losing weight through weight watches (30lbs)\par -she notes that she has just gotten her dental appliance for JOSHUA\par -she notes that she has started snoring\par -she notes that she is taking Symbicort\par \par -patient denies any headaches, nausea, vomiting, fever, chills, sweats, chest pain, chest pressure, palpitations, wheezing, fatigue, diarrhea, constipation, dysphagia, myalgias, dizziness, leg swelling, leg pain, heartburn, reflux or sour taste in the mouth

## 2022-08-25 NOTE — ASSESSMENT
[FreeTextEntry1] : Ms. Dickinson is 62 yo has a h/o asthma, iron deficiency anemia,  allergy, GERS, OSAS, s/p PNA, SLE / Sicca. ?Dx of SLE; s/p COVID-19 vaccine x4- now with active asthma/allergy\par \par Her shortness of breath is multifactorial due to:\par -obesity\par -out of shape\par -asthma\par -? CAD\par -poor breathing mechanics \par -(Anemia)\par \par problem 1: asthma (active)\par -continue to use Singulair 10 mg before bed\par -continue Xopenex 1.25 with the nebulizer TID\par -continue Ventolin rescue inhaler 2 inhalations before exercise, Q6H \par -continue Symbicort (160) 2 inhalations BID\par -Asthma is believed to be caused by inherited (genetic) and environmental factor, but its exact cause is unknown. Asthma may be triggered by allergens, lung infections, or irritants in the air. Asthma triggers are different for each person\par -Inhaler technique reviewed as well as oral hygiene techniques reviewed with patient. Avoidance of cold air, extremes of temperature, rescue inhaler should be used before exercise. Order of medication reviewed with patient. Recommended use of a cool mist humidifier in the bedroom. \par \par problem 1A: Fe Anemia - PRN\par -f/u Dr. Boyce for infusion\par \par problem 2: allergic rhinitis \par -continue Astelin (.15) 1 sniff each nostril BID or \par -addXyzal 5 mg QHS \par - Recommended Navage saline \par -continue to use nasal saline\par -followed by Flonase 1 sniff each nostril BID \par -followed by Olopatadine 1 sniff each nostril (0.6)\par -can use Boroleum ointment PRN\par -X-lear QD PRN \par -Environmental measures for allergies were encouraged including mattress and pillow cover, air purifier, and environmental controls.\par \par problem 3: GERD\par -continue to use Dexilant 60 mg BID (before meals)\par -continue Pepcid 40 mg before bed \par -restart use of Carafate PRN\par -discussed Rule of 2's; pt should avoid eating too much; too fast; too spicy; too lousy; less than two hours before bed \par -Things to avoid including overeating, spicy foods, tight clothing, eating within three hours of bed, this list is not all inclusive. \par -For treatment of reflux, possible options discussed including diet control, H2 blockers, PPIs, as well as coating motility agents discussed as treatment options. Timing of meals and proximity of last meal to sleep were discussed. If symptoms persist, a formal gastrointestinal evaluation is needed.\par \par problem 4: obesity (improved)\par -Recommend "Muniq" OTC Supplement for weight loss, energy levels, and blood sugar levels \par -Weight loss, exercise, and diet control were discussed and are highly encouraged. Treatment options were given such as, aqua therapy, and contacting a nutritionist. Recommended to use the elliptical, stationary bike, less use of treadmill. Obesity is associated with worsening asthma, shortness of breath, and potential for cardiac disease, diabetes, and other underlying medical conditions. \par \par problem 5: mild JOSHUA\par -s/p HSS - move to HSS \par -dental device (Osbaldo)\par -her recent sleep study found mild OSAS- recommended a Dental Device, the names Dr. Roblero is recommended for her based on her location \par -Sleep apnea is associated with adverse clinical consequences which an affect most organ systems. Cardiovascular disease risk includes arrhythmias, atrial fibrillation, hypertension, coronary artery disease, and stroke. Metabolic disorders include diabetes type 2, non-alcoholic fatty liver disease. Mood disorder especially depression; and cognitive decline especially in the elderly. Associations with chronic reflux/Chavis’s esophagus some but not all inclusive.\par -Reasons include arousal consistent with hypopnea; respiratory events most prominent in REM sleep or supine position; therefore sleep staging and body position are important for accurate diagnosis and estimation of AHI.\par -Treatment options discussed including CPAP/BiPAP machine, oral appliance, ProVent therapy, Oxy-Aid by Respitec, new technologies, or positional sleep.Recommended use of the CPAP machine for moderate (AHI >15), moderate to severe (AHI 15-30) and severe patients (AHI > 30). Recommended weight loss which can reduce AHI especially in weight loss of greater than 5% of BMI. Positional sleep is recommended in those with low AHI, low-moderate MBI, and younger age. For severe sleep apnea, the hypoglossal nerve stimulator was recommended as well. \par \par problem 6: poor sleep\par - Recommended either Dr. Herbie Roblero or Dr. Yamila Albarran for a dental device vs. "Excite"\par -recommending Sleep Guard\par -recommending pt wear sunglasses 30min before bed\par -Good sleep hygiene was encouraged including avoiding watching television an hour before bed, keeping caffeine at a low, avoiding reading, television, or anything, in bed, no drinking any liquids three hours before bedtime, and only getting into bed when tired and ready for sleep\par \par problem 7: sicca\par -recommended to use Mouth Kote / Evoxac 30 mg QAM\par \par Problem 8: Health Maintenance/COVID19 Precautions \par -s/p Covid-19 vaccine Pfizer x4\par - Clean your hands often. Wash your hands often with soap and water for at least 20 seconds, especially after blowing your nose, coughing, or sneezing, or having been in a public place.\par - If soap and water are not available, use a hand  that contains at least 60% alcohol.\par - To the extent possible, avoid touching high-touch surfaces in public places - elevator buttons, door handles, handrails, handshaking with people, etc. Use a tissue or your sleeve to cover your hand or finger if you must touch something.\par - Wash your hands after touching surfaces in public places.\par - Avoid touching your face, nose, eyes, etc.\par - Clean and disinfect your home to remove germs: practice routine cleaning of frequently touched surfaces (for example: tables, doorknobs, light switches, handles, desks, toilets, faucets, sinks & cell phones)\par - Avoid crowds, especially in poorly ventilated spaces. Your risk of exposure to respiratory viruses like COVID-19 may increase in crowded, closed-in settings with little air circulation if there are people in the crowd who are sick. All patients are recommended to practice social distancing and stay at least 6 feet away from others. \par - Avoid all non-essential travel including plane trips, and especially avoid embarking on cruise ships.\par -If COVID-19 is spreading in your community, take extra measures to put distance between yourself and other people to further reduce your risk of being exposed to this new virus.\par -Stay home as much as possible.\par - Consider ways of getting food brought to your house through family, social, or commercial networks\par -Be aware that the virus has been known to live in the air up to 3 hours post exposure, cardboard up to 24 hours post exposure, copper up to 4 hours post exposure, steel and plastic up to 2-3 days post exposure. Risk of transmission from these surfaces are affected by many variables.\par COVID-19 precautionary Immune Support Recommendations:\par -OTC Vitamin C 500mg BID \par -OTC Quercetin 250-500mg BID \par -OTC Zinc 75-100mg per day \par -OTC Melatonin 1 or 2mg a night \par -OTC Vitamin D 1-4000mg per day \par -OTC Tonic Water 8oz per day\par -Water 0.5-1 gallon per day\par Asthma and COVID19:\par You need to make sure your asthma is under control. This often requires the use of inhaled corticosteroids (and sometimes oral corticosteroids). Inhaled corticosteroids do not likely reduce your immune system’s ability to fight infections, but oral corticosteroids may. It is important to use the steps above to protect yourself to limit your exposure to any respiratory virus. \par \par Problem 9:health maintenance \par -Recommended Functional medicine follow up with Dr. Sejal Myrick\par -s/p flu shot 2021\par -recommended strep pneumonia vaccines: Prevnar-13 vaccine, followed by Pneumo vaccine 23 one year following\par -recommended early intervention for URIs\par -recommended regular osteoporosis evaluations\par -recommended early dermatological evaluations\par -recommended after the age of 50 to the age of 70, colonoscopy every 5 years \par F/U in 4 months\par She is encouraged to call with any changes, concerns, or questions.

## 2022-08-25 NOTE — ADDENDUM
[FreeTextEntry1] : Documented by Ethan Castillo acting as a scribe for Dr. Len Gonzalez on 08/25/2022.\par \par All medical record entries made by the Scribe were at my, Dr. Len Gonzalez's, direction and personally dictated by me on 08/25/2022. I have reviewed the chart and agree that the record accurately reflects my personal performance of the history, physical exam, assessment and plan. I have also personally directed, reviewed, and agree with the discharge instructions.

## 2022-08-26 ENCOUNTER — APPOINTMENT (OUTPATIENT)
Dept: ORTHOPEDIC SURGERY | Facility: CLINIC | Age: 62
End: 2022-08-26

## 2022-08-26 DIAGNOSIS — M23.91 UNSPECIFIED INTERNAL DERANGEMENT OF RIGHT KNEE: ICD-10-CM

## 2022-08-26 PROCEDURE — 20611 DRAIN/INJ JOINT/BURSA W/US: CPT

## 2022-08-26 PROCEDURE — J3490M: CUSTOM

## 2022-08-26 PROCEDURE — 99214 OFFICE O/P EST MOD 30 MIN: CPT | Mod: 25

## 2022-08-26 NOTE — PROCEDURE
[Large Joint Injection] : Large joint injection [Right] : of the right [Knee] : knee [Pain] : pain [Inflammation] : inflammation [X-ray evidence of Osteoarthritis on this or prior visit] : x-ray evidence of Osteoarthritis on this or prior visit [Alcohol] : alcohol [Ethyl Chloride sprayed topically] : ethyl chloride sprayed topically [Sterile technique used] : sterile technique used [___ cc    6mg] :  Betamethasone (Celestone) ~Vcc of 6mg [___ cc    1%] : Lidocaine ~Vcc of 1%  [___ cc    0.25%] : Bupivacaine (Marcaine) ~Vcc of 0.25%  [Effusion] : effusion [] : Patient tolerated procedure well [Call if redness, pain or fever occur] : call if redness, pain or fever occur [Apply ice for 15min out of every hour for the next 12-24 hours as tolerated] : apply ice for 15 minutes out of every hour for the next 12-24 hours as tolerated [Patient was advised to rest the joint(s) for ____ days] : patient was advised to rest the joint(s) for [unfilled] days [Previous OTC use and PT nontherapeutic] : patient has tried OTC's including aspirin, Ibuprofen, Aleve, etc or prescription NSAIDS, and/or exercises at home and/or physical therapy without satisfactory response [Patient had decreased mobility in the joint] : patient had decreased mobility in the joint [Risks, benefits, alternatives discussed / Verbal consent obtained] : the risks benefits, and alternatives have been discussed, and verbal consent was obtained [Prior failure or difficult injection] : prior failure or difficult injection [All ultrasound images have been permanently captured and stored accordingly in our picture archiving and communication system] : All ultrasound images have been permanently captured and stored accordingly in our picture archiving and communication system [Visualization of the needle and placement of injection was performed without complication] : visualization of the needle and placement of injection was performed without complication [de-identified] : 13cc [de-identified] : blood tinged synovial fluid

## 2022-08-26 NOTE — DISCUSSION/SUMMARY
[de-identified] : Right knee medial aspiration tolerated well.\par Right knee CSI tolerated well.\par Ice/brace

## 2022-08-26 NOTE — DATA REVIEWED
[MRI] : MRI [Right] : of the right [Knee] : knee [I independently reviewed and interpreted images and report] : I independently reviewed and interpreted images and report [I reviewed the films/CD and agree] : I reviewed the films/CD and agree [FreeTextEntry1] : 8/19/22: distal quad tendinosis, no tear

## 2022-08-26 NOTE — PHYSICAL EXAM
[Right] : right knee [NL (0)] : extension 0 degrees [5___] : quadriceps 5[unfilled]/5 [] : negative Valgus instability [FreeTextEntry3] : anterior knee abrasion healed\par medial swelling [TWNoteComboBox7] : flexion 110 degrees

## 2022-08-26 NOTE — HISTORY OF PRESENT ILLNESS
[Right Leg] : right leg [Gradual] : gradual [Sudden] : sudden [5] : 5 [4] : 4 [Burning] : burning [Shooting] : shooting [Stabbing] : stabbing [Intermittent] : intermittent [Retired] : Work status: retired [de-identified] : Patient Complaint - NI garcia ankles. Fell down stairs 1/14/18. [facility] [place] in boot R a\par ankle. Prior ankle recon.\par 1/22/18: ORIF L joann\par 1/31/18: f/u L ankle, NWB splint, Some tingling and pain to the foot.\par 2/7/18: f/u L ankle, NWB in bivalved cast. Seen by Dr Copeland as cast was too tight. improved immediatel\par Dry skin and altered sensation to 3,4 toes. Had infusion 2/5/18. Clustrophobic\par 2/16/18: f/u L ankle, continued pain and claustrophobia in the cast\par 2/28/18: f/u L ankle, NWB in CAM boot, flying tomorrow. Noted oozing last night (was at show) No f/c. T\par 3/14/18: f/u L ankle, continued swelling. continued pain to the dorsal foot. PT/HEP. Euflexxa #1 b/l knee\par 3/23/18: Euflexxa # 2 bilateral knees\par 3/28/18: Euflexxa # 3 bilateral knees\par 4/11/18 f/u garcia knees. Had god response to injections. Foot pain/burning persists. Request med r/n\par 5/2/18 f/u L ankle EMG pos for neuropathy\par 5/16/18 f/u L foot. Pain worse after packing up in [place]\par 6/6/18 f/u L foot sl improved\par 6/20/18: f/u L foot. Here to discuss emg results. Upper studies reported nl. L foot and ankle swelling imp\par eval started Neurontin 400 qid w improved symptoms\par 10/18/18 f/u L foot and ankle. Had increased pain this am\par 11/8/18 Euflexxa #1 garcia knees\par 11/16/18: euflexxa # 2 bilateral knees\par 11/23/18: euflexxa # 3 bilateral knees\par 2/22/19: f/u L ankle, continued intermittent pain that is more medial, tingling persists. Also lateral lumps \par ankle\par 4/3/19 f/u L ankle Tingling persists. On Neurontin 600 6x/day, Cymbalta increasing 90 to 120\par 5/22/19 f/u L ankle HEP/PT Tolerating Cymbalta/Neurontin\par 1/8/20 f/u L ankle jarek medially. Burning pain persists\par 1/22/20 f/u L ankle Garcia knee Euflexxa #1\par 1/29/2020 f/u L ankle. Garcia knee Euflexxa #2\par 2/4/20 f/u LT ankle, B/L knee Euflexxa #3\par 6/10/20 f/u garcia knees Ant knee pain jarek stairs\par 7/17/20: Euflexxa #1 B/L knees\par 08/26/2022 pt is here to review the right knee mri results \par 7/24/20 Euflexxa #2 garcia knees\par 8/4/20: Euflexxa # 3 bilateral knees. better with stairs.\par 2/10/21 f/u garcia knees, had good response to injections Requ meds\par 2/17/21 Euflexxa #1 garcia knees\par 2/23/21: euflexxa # 2 garcia knees\par 3/2/21: euflexxa #3 bilateral knees. fell on the knees last night due to neuropathy\par 10/15/21: Pt here for fu on bilateral knees. There is continued pain. Euflexxa # 1 bilateral knees\par 10/22/21: bilateral knee Euflexxa #2\par 10/29/21: bilateral knee euflexxa #3\par 2/25/22: R lateral ankle pain and swelling since 12/2022. She denies injury. [place] \par Pain shoots into the lateral foot at times. She took Percocet the pain was so bad. Prior ankle fracture 2018\par 3/11/22: f/u R ankle to review the MRI. Symptoms the same.\par 3/17/22 f/u R ankle Leaving to [place] requ injection\par 6/30/22  f/uBil knees Euflexa #1; left knee worse\par 7/7/22  Euflexxa #2 garcia knee\par 7/15/22: Euflexxa # 3 bilateral knees\par \par 8/19/22: NI R knee twisting injury and fall on 8/17/22. Swelling and pain to the medial and anterior aspect of the knee. Pain with weightbearing. \par 8/26/22: f/u R knee to review the MRI. Right knee pain and swelling persists. She is using the brace\par  [] : Post Surgical Visit: no [FreeTextEntry1] : right knee  [FreeTextEntry5] : Pt recently tripped and fell on to her knee and felt a sudden onset of pain that has not gone away, pt is also having swelling in the area  [de-identified] : mri done at [facility]  [de-identified] : None  [de-identified] : 7/15/22

## 2022-08-26 NOTE — IMAGING
[Right] : right knee [AP] : anteroposterior [Lateral] : lateral [[place]] : skyline [There are no fractures, subluxations or dislocations. No significant abnormalities are seen] : There are no fractures, subluxations or dislocations. No significant abnormalities are seen

## 2022-08-31 ENCOUNTER — APPOINTMENT (OUTPATIENT)
Dept: ORTHOPEDIC SURGERY | Facility: CLINIC | Age: 62
End: 2022-08-31

## 2022-08-31 VITALS — HEIGHT: 66 IN | BODY MASS INDEX: 30.86 KG/M2 | WEIGHT: 192 LBS

## 2022-08-31 DIAGNOSIS — M76.899 OTHER SPECIFIED ENTHESOPATHIES OF UNSPECIFIED LOWER LIMB, EXCLUDING FOOT: ICD-10-CM

## 2022-08-31 PROCEDURE — 99213 OFFICE O/P EST LOW 20 MIN: CPT

## 2022-08-31 PROCEDURE — 99203 OFFICE O/P NEW LOW 30 MIN: CPT

## 2022-08-31 NOTE — PHYSICAL EXAM
[Right] : right knee [NL (0)] : extension 0 degrees [] : patient ambulates without assistive device [TWNoteComboBox7] : flexion 130 degrees

## 2022-08-31 NOTE — DATA REVIEWED
[MRI] : MRI [Right] : of the right [Knee] : knee [Report was reviewed and noted in the chart] : The report was reviewed and noted in the chart [I independently reviewed and interpreted images and report] : I independently reviewed and interpreted images and report [I reviewed the films/CD] : I reviewed the films/CD [FreeTextEntry1] : 08.19.22 (zp)\par 1. Distal quadriceps tendinosis, without tears.\par 2. Soft tissue edema on the anterior aspect of the knee.\par 3. Small popliteal cyst.\par

## 2022-08-31 NOTE — DISCUSSION/SUMMARY
[de-identified] : 61f with right knee quad tendinitis and effusion\par 1) cryotherapy, rest and activity modification\par 2) physical therapy / hep\par 3) c/w brace\par \par \par Entered by Jane Wray acting as scribe.

## 2022-08-31 NOTE — HISTORY OF PRESENT ILLNESS
[de-identified] : 8/31/22: 61F who presents today with right knee pain that has been occurring since 8/30/22. Patient noticed an increase in swelling and achey pain in the knee suddenly. Has been seeing Dr. Wetzel for her knee pain. Recently had an aspiration on august 26th with CSI and aspiration which helped. Also hx of euflexxa injections with Dr. Wetzel. Patient has been wearing HKB which has been helping. HX of lupus.

## 2022-09-21 ENCOUNTER — NON-APPOINTMENT (OUTPATIENT)
Age: 62
End: 2022-09-21

## 2022-09-21 ENCOUNTER — APPOINTMENT (OUTPATIENT)
Dept: PULMONOLOGY | Facility: CLINIC | Age: 62
End: 2022-09-21

## 2022-09-21 ENCOUNTER — TRANSCRIPTION ENCOUNTER (OUTPATIENT)
Age: 62
End: 2022-09-21

## 2022-09-21 PROCEDURE — 99213 OFFICE O/P EST LOW 20 MIN: CPT | Mod: 95

## 2022-09-21 RX ORDER — DEXTROAMPHETAMINE SACCHARATE, AMPHETAMINE ASPARTATE, DEXTROAMPHETAMINE SULFATE AND AMPHETAMINE SULFATE 3.75; 3.75; 3.75; 3.75 MG/1; MG/1; MG/1; MG/1
15 TABLET ORAL
Qty: 90 | Refills: 0 | Status: DISCONTINUED | COMMUNITY
Start: 2016-11-23 | End: 2022-09-21

## 2022-09-21 RX ORDER — CLARITHROMYCIN 500 MG/1
500 TABLET, FILM COATED ORAL
Qty: 20 | Refills: 0 | Status: DISCONTINUED | COMMUNITY
Start: 2017-07-10 | End: 2022-09-21

## 2022-09-21 RX ORDER — OFLOXACIN 3 MG/ML
0.3 SOLUTION/ DROPS OPHTHALMIC
Qty: 5 | Refills: 0 | Status: DISCONTINUED | COMMUNITY
Start: 2017-03-02 | End: 2022-09-21

## 2022-09-21 RX ORDER — AZELASTINE HYDROCHLORIDE 137 UG/1
0.1 SPRAY, METERED NASAL TWICE DAILY
Qty: 3 | Refills: 1 | Status: DISCONTINUED | COMMUNITY
Start: 2020-07-08 | End: 2022-09-21

## 2022-09-21 RX ORDER — GLYCOPYRROLATE AND FORMOTEROL FUMARATE 9; 4.8 UG/1; UG/1
9-4.8 AEROSOL, METERED RESPIRATORY (INHALATION)
Qty: 3 | Refills: 1 | Status: DISCONTINUED | COMMUNITY
Start: 2018-07-10 | End: 2022-09-21

## 2022-09-21 RX ORDER — DEXAMETHASONE 4 MG/1
4 TABLET ORAL
Qty: 6 | Refills: 0 | Status: DISCONTINUED | COMMUNITY
Start: 2017-04-24 | End: 2022-09-21

## 2022-09-21 RX ORDER — MOMETASONE FUROATE 220 UG/1
220 INHALANT RESPIRATORY (INHALATION)
Qty: 3 | Refills: 1 | Status: DISCONTINUED | COMMUNITY
Start: 2017-02-22 | End: 2022-09-21

## 2022-09-21 RX ORDER — GABAPENTIN 400 MG/1
400 CAPSULE ORAL
Qty: 90 | Refills: 0 | Status: DISCONTINUED | COMMUNITY
Start: 2018-06-13 | End: 2022-09-21

## 2022-09-21 RX ORDER — GLYCOPYRROLATE AND FORMOTEROL FUMARATE 9; 4.8 UG/1; UG/1
9-4.8 AEROSOL, METERED RESPIRATORY (INHALATION)
Qty: 3 | Refills: 1 | Status: DISCONTINUED | COMMUNITY
Start: 2017-07-24 | End: 2022-09-21

## 2022-09-21 RX ORDER — DEXTROAMPHETAMINE SACCHARATE, AMPHETAMINE ASPARTATE, DEXTROAMPHETAMINE SULFATE AND AMPHETAMINE SULFATE 7.5; 7.5; 7.5; 7.5 MG/1; MG/1; MG/1; MG/1
30 TABLET ORAL
Qty: 30 | Refills: 0 | Status: DISCONTINUED | COMMUNITY
Start: 2022-01-07 | End: 2022-09-21

## 2022-09-21 RX ORDER — MOMETASONE FUROATE 220 UG/1
220 INHALANT RESPIRATORY (INHALATION)
Qty: 3 | Refills: 1 | Status: DISCONTINUED | COMMUNITY
Start: 2018-07-10 | End: 2022-09-21

## 2022-09-21 RX ORDER — GABAPENTIN 600 MG/1
600 TABLET, COATED ORAL
Qty: 180 | Refills: 0 | Status: DISCONTINUED | COMMUNITY
Start: 2022-04-19 | End: 2022-09-21

## 2022-09-21 NOTE — PLAN
[FreeTextEntry1] : Ms. Dickinson is a 61 year old female presenting to the office via video for an acute care visit. She has history of asthma, allergies, GERD, JOSHUA, PNA history, poor sleep, postnasal drip, sicca, sleep disordered breathing, and shortness of breath. Her  is a patient of this office and video visit was performed under him. Her chief concern is testing (+) for Covid infection via at home rapid test on 9/21/22.\par \par 1. Covid:\par - Add Paxlovid x 5 days. Given medication interaction of Paxlovid and Klonopin - advised to discontinue to Klonopin while Paxlovid is in use and then 2 days after completion of medication.\par - Supporitve care.\par \par 2. Asthma:\par - Add Levalbuterol via nebulizer at least BID.\par - Continue Symbicort 2 puffs BID. Rinse and gargle post use. \par \par Patient to follow up with Dr. Gonzalez as scheduled.\par Patient to call with further questions and concerns.\par Patient verbalizes understanding of care plan and is agreeable.\par

## 2022-09-21 NOTE — HISTORY OF PRESENT ILLNESS
[Home] : at home, [unfilled] , at the time of the visit. [Medical Office: ([facility])___] : at the medical office located in  [Spouse] : spouse [Verbal consent obtained from patient] : the patient, [unfilled] [FreeTextEntry1] : Ms. Dickinson is a 61 year old female presenting to the office via video for an acute care visit. She has history of asthma, allergies, GERD, JOSHUA, PNA history, poor sleep, postnasal drip, sicca, sleep disordered breathing, and shortness of breath. Her  is a patient of this office and video visit was performed under him. \par \par Her chief concern is testing (+) for Covid infection via at home rapid test on 9/21/22.\par \par Patient states symptom onset at 2 AM this morning of coughing.  She notes her  has told her she has been wheezing.  She feels like "a wet dish rag".  She complains of nausea, headache, head fullness causing dizziness.  She is currently on Symbicort 2 puffs BID. \par \par She denies SOB or chest tightness. \par

## 2022-10-04 ENCOUNTER — APPOINTMENT (OUTPATIENT)
Dept: PULMONOLOGY | Facility: CLINIC | Age: 62
End: 2022-10-04

## 2022-10-04 DIAGNOSIS — J98.01 ACUTE BRONCHOSPASM: ICD-10-CM

## 2022-10-04 DIAGNOSIS — J01.90 ACUTE SINUSITIS, UNSPECIFIED: ICD-10-CM

## 2022-10-04 PROCEDURE — 99442: CPT

## 2022-10-04 RX ORDER — FLUTICASONE PROPIONATE 50 UG/1
50 SPRAY, METERED NASAL DAILY
Qty: 1 | Refills: 1 | Status: ACTIVE | COMMUNITY
Start: 2022-10-04 | End: 1900-01-01

## 2022-10-04 NOTE — PLAN
[FreeTextEntry1] : Ms. Dickinson is a 61 year old female presenting to the office via video for an acute care visit. She has history of asthma, allergies, GERD, JOSHUA, PNA history, poor sleep, postnasal drip, sicca, sleep disordered breathing, and shortness of breath. She presents via telephone for an acute care visit. Her chief concern is testing (+) for Covid infection via at home rapid test on 10/4/22 (after already testing positive on 9/21/22 and s/p Paxlovid).\par \par 1. Post infectious bronchospasm/Asthma:\par - Add Levalbuterol via neb TID.\par - Continue Symbicort 2 puffs BID.\par - Discussed addition of Prednisone - patient wants to hold off at this time, but will contact office if no improvement and then will add.\par \par 2. Sinusitis:\par - Add Augmentin BID x 10 days.\par - Fluticasone (Flonase) 50 mcg Nasal Spray 1-2 Sprays each nostril in AM&PM.\par - Continue Olopatadine nasal spray 1 spray each nostril BID.\par \par Patient to follow up with Dr. Gonzalez as scheduled.\par Patient to call with further questions and concerns.\par Patient verbalizes understanding of care plan and is agreeable.\par

## 2022-10-04 NOTE — HISTORY OF PRESENT ILLNESS
[Home] : at home, [unfilled] , at the time of the visit. [Other Location: e.g. Home (Enter Location, City,State)___] : at [unfilled] [Verbal consent obtained from patient] : the patient, [unfilled] [FreeTextEntry1] : Ms. Dickinson is a 61 year old female presenting to the office via video for an acute care visit. She has history of asthma, allergies, GERD, JOSHUA, PNA history, poor sleep, postnasal drip, sicca, sleep disordered breathing, and shortness of breath. She presents via telephone for an acute care visit. \par \par Her chief concern is testing (+) for Covid infection via at home rapid test on 10/4/22. \par \par 9/21/22 Video Visit:\par Patient tested (+) on 9/21/22. Patient states symptom onset at 2 AM this morning of coughing. She notes her  has told her she has been wheezing. She feels like "a wet dish rag". She complains of nausea, headache, head fullness causing dizziness. She is currently on Symbicort 2 puffs BID. \par \par 10/4/22 Telephonic Visit:\par \par She states she did take Paxlovid x 5 days after last visit. She notes she has a mother with Leukemia and would not go visit her until she tested negative which is why she retested herself three days ago.  She notes at that time testing negative.  She states she felt almost 100% better as of yesterday, but upon awakening this morning she felt sick.  She c/o fatigue, sinus pressure, sinus headache "behind my eyeballs", coughing, wheezing, and chest heaviness. She states she retested herself for Covid-19 using an at home test which resulted positive very quickly. She tried using Advil cold & sinus to help with symptoms, but sinus congestion/headache persist.\par \par She states she continued using Symbicort inhaler as cough never resolved, but she plans to re-add nebulizer. She states she uses Olopatadine nasal spray. \par \par She denies SOB.\par

## 2022-10-17 ENCOUNTER — TRANSCRIPTION ENCOUNTER (OUTPATIENT)
Age: 62
End: 2022-10-17

## 2022-10-17 RX ORDER — FAMOTIDINE 40 MG/1
40 TABLET, FILM COATED ORAL
Qty: 3 | Refills: 1 | Status: ACTIVE | COMMUNITY
Start: 2017-02-22 | End: 1900-01-01

## 2022-12-18 ENCOUNTER — NON-APPOINTMENT (OUTPATIENT)
Age: 62
End: 2022-12-18

## 2022-12-20 ENCOUNTER — APPOINTMENT (OUTPATIENT)
Dept: PULMONOLOGY | Facility: CLINIC | Age: 62
End: 2022-12-20

## 2022-12-20 VITALS
HEART RATE: 89 BPM | OXYGEN SATURATION: 95 % | SYSTOLIC BLOOD PRESSURE: 130 MMHG | DIASTOLIC BLOOD PRESSURE: 84 MMHG | RESPIRATION RATE: 16 BRPM | HEIGHT: 66 IN | WEIGHT: 200 LBS | BODY MASS INDEX: 32.14 KG/M2 | TEMPERATURE: 96.5 F

## 2022-12-20 DIAGNOSIS — Z71.89 OTHER SPECIFIED COUNSELING: ICD-10-CM

## 2022-12-20 DIAGNOSIS — J06.9 ACUTE UPPER RESPIRATORY INFECTION, UNSPECIFIED: ICD-10-CM

## 2022-12-20 PROCEDURE — 99214 OFFICE O/P EST MOD 30 MIN: CPT | Mod: 25

## 2022-12-20 PROCEDURE — 95012 NITRIC OXIDE EXP GAS DETER: CPT

## 2022-12-20 NOTE — ASSESSMENT
[FreeTextEntry1] : Ms. Dickinson is 62 yo has a h/o asthma, iron deficiency anemia,  allergy, GERS, OSAS, s/p PNA, SLE / Sicca. ?Dx of SLE; s/p COVID-19 vaccine x4- now with URI/ bronchospasm\par \par Her shortness of breath is multifactorial due to:\par -obesity\par -out of shape\par -asthma\par -? CAD\par -poor breathing mechanics \par -(Anemia)\par \par problem 1: asthma (active)\par -continue to use Singulair 10 mg before bed\par -continue Xopenex 1.25 with the nebulizer TID\par -continue Ventolin rescue inhaler 2 inhalations before exercise, Q6H \par -continue Symbicort (160) 2 inhalations BID\par -Asthma is believed to be caused by inherited (genetic) and environmental factor, but its exact cause is unknown. Asthma may be triggered by allergens, lung infections, or irritants in the air. Asthma triggers are different for each person\par -Inhaler technique reviewed as well as oral hygiene techniques reviewed with patient. Avoidance of cold air, extremes of temperature, rescue inhaler should be used before exercise. Order of medication reviewed with patient. Recommended use of a cool mist humidifier in the bedroom. \par \par Problem 1B: URI\par -add Augmentin 875 mg BID \par -get RVP \par \par problem 1A: Fe Anemia - PRN\par -f/u Dr. Boyce for infusion\par \par problem 2: allergic rhinitis \par -continue Astelin (.15) 1 sniff each nostril BID or \par -add Xyzal 5 mg QHS \par - Recommended Navage saline \par -continue to use nasal saline\par -followed by Flonase 1 sniff each nostril BID \par -followed by Olopatadine 1 sniff each nostril (0.6)\par -can use Boroleum ointment PRN\par -X-lear QD PRN \par -Environmental measures for allergies were encouraged including mattress and pillow cover, air purifier, and environmental controls.\par \par problem 3: GERD\par -continue to use Dexilant 60 mg BID (before meals)\par -continue Pepcid 40 mg before bed \par -restart use of Carafate PRN\par -discussed Rule of 2's; pt should avoid eating too much; too fast; too spicy; too lousy; less than two hours before bed \par -Things to avoid including overeating, spicy foods, tight clothing, eating within three hours of bed, this list is not all inclusive. \par -For treatment of reflux, possible options discussed including diet control, H2 blockers, PPIs, as well as coating motility agents discussed as treatment options. Timing of meals and proximity of last meal to sleep were discussed. If symptoms persist, a formal gastrointestinal evaluation is needed.\par \par problem 4: obesity (improved)\par -Recommend "Muniq" OTC Supplement for weight loss, energy levels, and blood sugar levels \par -Weight loss, exercise, and diet control were discussed and are highly encouraged. Treatment options were given such as, aqua therapy, and contacting a nutritionist. Recommended to use the elliptical, stationary bike, less use of treadmill. Obesity is associated with worsening asthma, shortness of breath, and potential for cardiac disease, diabetes, and other underlying medical conditions. \par \par problem 5: mild JOSHUA\par -s/p HSS - move to HSS \par -dental device (Osbaldo)\par -her recent sleep study found mild OSAS- recommended a Dental Device, the names Dr. Roblero is recommended for her based on her location \par -Sleep apnea is associated with adverse clinical consequences which an affect most organ systems. Cardiovascular disease risk includes arrhythmias, atrial fibrillation, hypertension, coronary artery disease, and stroke. Metabolic disorders include diabetes type 2, non-alcoholic fatty liver disease. Mood disorder especially depression; and cognitive decline especially in the elderly. Associations with chronic reflux/Chavis’s esophagus some but not all inclusive.\par -Reasons include arousal consistent with hypopnea; respiratory events most prominent in REM sleep or supine position; therefore sleep staging and body position are important for accurate diagnosis and estimation of AHI.\par -Treatment options discussed including CPAP/BiPAP machine, oral appliance, ProVent therapy, Oxy-Aid by Respitec, new technologies, or positional sleep.Recommended use of the CPAP machine for moderate (AHI >15), moderate to severe (AHI 15-30) and severe patients (AHI > 30). Recommended weight loss which can reduce AHI especially in weight loss of greater than 5% of BMI. Positional sleep is recommended in those with low AHI, low-moderate MBI, and younger age. For severe sleep apnea, the hypoglossal nerve stimulator was recommended as well. \par \par problem 6: poor sleep\par - Recommended either Dr. Herbie Roblero or Dr. Yamila Albarran for a dental device vs. "Excite"\par -recommending Sleep Guard\par -recommending pt wear sunglasses 30min before bed\par -Good sleep hygiene was encouraged including avoiding watching television an hour before bed, keeping caffeine at a low, avoiding reading, television, or anything, in bed, no drinking any liquids three hours before bedtime, and only getting into bed when tired and ready for sleep\par \par problem 7: sicca\par -recommended to use Mouth Kote / Evoxac 30 mg QAM\par \par Problem 8: Health Maintenance/COVID19 Precautions \par -s/p Covid-19 vaccine Pfizer x4\par - Clean your hands often. Wash your hands often with soap and water for at least 20 seconds, especially after blowing your nose, coughing, or sneezing, or having been in a public place.\par - If soap and water are not available, use a hand  that contains at least 60% alcohol.\par - To the extent possible, avoid touching high-touch surfaces in public places - elevator buttons, door handles, handrails, handshaking with people, etc. Use a tissue or your sleeve to cover your hand or finger if you must touch something.\par - Wash your hands after touching surfaces in public places.\par - Avoid touching your face, nose, eyes, etc.\par - Clean and disinfect your home to remove germs: practice routine cleaning of frequently touched surfaces (for example: tables, doorknobs, light switches, handles, desks, toilets, faucets, sinks & cell phones)\par - Avoid crowds, especially in poorly ventilated spaces. Your risk of exposure to respiratory viruses like COVID-19 may increase in crowded, closed-in settings with little air circulation if there are people in the crowd who are sick. All patients are recommended to practice social distancing and stay at least 6 feet away from others. \par - Avoid all non-essential travel including plane trips, and especially avoid embarking on cruise ships.\par -If COVID-19 is spreading in your community, take extra measures to put distance between yourself and other people to further reduce your risk of being exposed to this new virus.\par -Stay home as much as possible.\par - Consider ways of getting food brought to your house through family, social, or commercial networks\par -Be aware that the virus has been known to live in the air up to 3 hours post exposure, cardboard up to 24 hours post exposure, copper up to 4 hours post exposure, steel and plastic up to 2-3 days post exposure. Risk of transmission from these surfaces are affected by many variables.\par COVID-19 precautionary Immune Support Recommendations:\par -OTC Vitamin C 500mg BID \par -OTC Quercetin 250-500mg BID \par -OTC Zinc 75-100mg per day \par -OTC Melatonin 1 or 2mg a night \par -OTC Vitamin D 1-4000mg per day \par -OTC Tonic Water 8oz per day\par -Water 0.5-1 gallon per day\par Asthma and COVID19:\par You need to make sure your asthma is under control. This often requires the use of inhaled corticosteroids (and sometimes oral corticosteroids). Inhaled corticosteroids do not likely reduce your immune system’s ability to fight infections, but oral corticosteroids may. It is important to use the steps above to protect yourself to limit your exposure to any respiratory virus. \par \par Problem 9:health maintenance \par -Recommended Functional medicine follow up with Dr. Sejal Myrick\par -s/p flu shot 2021\par -recommended strep pneumonia vaccines: Prevnar-13 vaccine, followed by Pneumo vaccine 23 one year following\par -recommended early intervention for URIs\par -recommended regular osteoporosis evaluations\par -recommended early dermatological evaluations\par -recommended after the age of 50 to the age of 70, colonoscopy every 5 years \par F/U in 4 months\par She is encouraged to call with any changes, concerns, or questions.

## 2022-12-20 NOTE — PHYSICAL EXAM

## 2022-12-20 NOTE — PROCEDURE
[FreeTextEntry1] : \par \par FENO was 16; a normal value being less than 25\par Fractional exhaled nitric oxide (FENO) is regarded as a simple, noninvasive method for assessing eosinophilic airway inflammation. Produced by a variety of cells within the lung, nitric oxide (NO) concentrations are generally low in healthy individuals. However, high concentrations of NO appear to be involved in nonspecific host defense mechanisms and chronic inflammatory diseases such as asthma. The American Thoracic Society (ATS) therefore has recommended using FENO to aid in the diagnosis and monitoring of eosinophilic airway inflammation and asthma, and for identifying steroid responsive individuals whose chronic respiratory symptoms may be caused by airway inflammation.

## 2022-12-20 NOTE — ADDENDUM
[FreeTextEntry1] : Documented by BRIGID Ruggiero acting as a scribe for Dr. Len Gonzalez on 12/20/2022 .\par \par All medical record entries made by the Scribe were at my, Dr. Len Gonzalez's, direction and personally dictated by me on 12/20/2022. I have reviewed the chart and agree that the record accurately reflects my personal performance of the history, physical exam, assessment and plan. I have also personally directed, reviewed, and agree with the discharge instructions.

## 2022-12-20 NOTE — HISTORY OF PRESENT ILLNESS
[FreeTextEntry1] : Ms. Dickinson is a 61 year old female presenting to the office for a follow up visit for asthma, allergies, GERD, JOSHUA, PNA history, poor sleep, postnasal drip, sicca, sleep disordered breathing, and shortness of breath. Her chief complaint is \par \par -she notes she has been ill since last week\par -she notes headaches\par -she notes sour taste in the mouth during the mornings\par -she notes sinuses feel inflamed\par -she notes itchy eyes\par -she notes GI ulcers are under control\par -she notes good quality of sleep \par -she notes compliant with DD and tolerating it well\par -she notes very mild snoring\par \par -she denies any nausea, vomiting, fever, chills, sweats, chest pain, chest pressure, coughing, wheezing, palpitations, constipation, diarrhea, dizziness, dysphagia, heartburn, reflux, itchy ears, leg swelling, leg pain, arthralgias, myalgias

## 2022-12-21 LAB
RAPID RVP RESULT: DETECTED
RV+EV RNA SPEC QL NAA+PROBE: DETECTED
SARS-COV-2 RNA PNL RESP NAA+PROBE: NOT DETECTED

## 2023-02-11 ENCOUNTER — RX RENEWAL (OUTPATIENT)
Age: 63
End: 2023-02-11

## 2023-03-27 ENCOUNTER — NON-APPOINTMENT (OUTPATIENT)
Age: 63
End: 2023-03-27

## 2023-04-19 ENCOUNTER — APPOINTMENT (OUTPATIENT)
Dept: PULMONOLOGY | Facility: CLINIC | Age: 63
End: 2023-04-19
Payer: COMMERCIAL

## 2023-04-19 VITALS
TEMPERATURE: 97.1 F | HEART RATE: 105 BPM | OXYGEN SATURATION: 97 % | DIASTOLIC BLOOD PRESSURE: 80 MMHG | BODY MASS INDEX: 34.55 KG/M2 | WEIGHT: 215 LBS | HEIGHT: 66 IN | SYSTOLIC BLOOD PRESSURE: 122 MMHG | RESPIRATION RATE: 16 BRPM

## 2023-04-19 DIAGNOSIS — J45.909 UNSPECIFIED ASTHMA, UNCOMPLICATED: ICD-10-CM

## 2023-04-19 DIAGNOSIS — U07.1 COVID-19: ICD-10-CM

## 2023-04-19 PROCEDURE — 95012 NITRIC OXIDE EXP GAS DETER: CPT

## 2023-04-19 PROCEDURE — 94010 BREATHING CAPACITY TEST: CPT

## 2023-04-19 PROCEDURE — 94729 DIFFUSING CAPACITY: CPT

## 2023-04-19 PROCEDURE — 99214 OFFICE O/P EST MOD 30 MIN: CPT | Mod: 25

## 2023-04-19 PROCEDURE — 94727 GAS DIL/WSHOT DETER LNG VOL: CPT

## 2023-04-19 NOTE — PROCEDURE
[FreeTextEntry1] : Full PFT reveals normal flows; FEV1 was 3.88  L which is 151 % of predicted; normal lung volumes; normal diffusion at 31.4 , which is 124 % of predicted; normal flow volume loop.\par PFTs were performed to evaluate for SOB and Asthma \par  \par FENO was ; 9 normal value being less than 25 Fractional exhaled nitric oxide (FENO) is regarded as a simple, noninvasive method for assessing eosinophilic airway inflammation. Produced by a variety of cells within the lung, nitric oxide (NO) concentrations are generally low in healthy individuals. However, high concentrations of NO appear to be involved in nonspecific host defense mechanisms and chronic inflammatory diseases such as asthma. The American Thoracic Society (ATS) therefore has recommended using FENO to aid in the diagnosis and monitoring of eosinophilic airway inflammation and asthma, and for identifying steroid responsive individuals whose chronic respiratory symptoms may be airway inflammation.

## 2023-04-19 NOTE — ADDENDUM
[FreeTextEntry1] : Documented by Ac Cevallos as a scribe for Dr. Len Gonzalez on 04/19/2023   \par \par All medical record entries made by the Scribe were at my, Dr. Len Gonzalez's, direction and personally dictated by me on 04/19/2023 . I have reviewed the chart and agree that the record accurately reflects my personal performance of the history, physical exam, assessment and plan. I have also personally directed, reviewed, and agree with the discharge instructions.

## 2023-04-19 NOTE — PHYSICAL EXAM
[No Acute Distress] : no acute distress [Normal Oropharynx] : normal oropharynx [Normal Appearance] : normal appearance [No Neck Mass] : no neck mass [Normal Rate/Rhythm] : normal rate/rhythm [Normal S1, S2] : normal s1, s2 [No Murmurs] : no murmurs [No Resp Distress] : no resp distress [Clear to Auscultation Bilaterally] : clear to auscultation bilaterally [No Abnormalities] : no abnormalities [Benign] : benign [Normal Gait] : normal gait [No Clubbing] : no clubbing [No Cyanosis] : no cyanosis [No Edema] : no edema [FROM] : FROM [Normal Color/ Pigmentation] : normal color/ pigmentation [No Focal Deficits] : no focal deficits [Oriented x3] : oriented x3 [Normal Affect] : normal affect [III] : Mallampati Class: III [TextBox_2] : ow rosacea on face [TextBox_68] : I:E 1:3; clear

## 2023-04-19 NOTE — HISTORY OF PRESENT ILLNESS
[FreeTextEntry1] : Ms. Dickinson is a 62 year old female presenting to the office for a follow up visit for asthma, allergies, GERD, JOSHUA, PNA history, poor sleep, postnasal drip, sicca, sleep disordered breathing, and shortness of breath. Her chief complaint is \par \par -she notes she sleeps but she doesn’t sleep well \par -she notes she doesn’t feel good \par -she notes no SOB\par -she notes her lupus is active \par -she notes she feels weak, tired and hot\par -she notes she only gets iron infusions when she needs them \par -she notes she sleeps a lot around 10 hrs a day \par -she notes her sinuses have been "iffy"\par -she notes her diet is "ugly"\par \par -she denies any headaches, nausea, emesis, fever, chills, sweats, chest pain, chest pressure, coughing, wheezing, palpitations, constipation, diarrhea, vertigo, dysphagia, heartburn, reflux, itchy eyes, itchy ears, leg swelling, arthralgias, myalgias, or sour taste in the mouth.

## 2023-04-19 NOTE — ASSESSMENT
[FreeTextEntry1] : Ms. Dickinson is 61 yo has a h/o asthma, iron deficiency anemia,  allergy, GERS, OSAS, s/p PNA, SLE / Sicca. ?Dx of SLE; s/p COVID-19 vaccine x4- now S/p URI/ bronchospasm - Active SLE (Dissih / Porges)\par \par Her shortness of breath is multifactorial due to:\par -obesity\par -out of shape\par -asthma\par -? CAD\par -poor breathing mechanics \par -(Anemia)\par \par problem 1: asthma (quiet)\par -continue to use Singulair 10 mg before bed\par -continue Xopenex 1.25 with the nebulizer TID\par -continue Ventolin rescue inhaler 2 inhalations before exercise, Q6H \par -continue Symbicort (160) 2 inhalations BID\par -Asthma is believed to be caused by inherited (genetic) and environmental factor, but its exact cause is unknown. Asthma may be triggered by allergens, lung infections, or irritants in the air. Asthma triggers are different for each person\par -Inhaler technique reviewed as well as oral hygiene techniques reviewed with patient. Avoidance of cold air, extremes of temperature, rescue inhaler should be used before exercise. Order of medication reviewed with patient. Recommended use of a cool mist humidifier in the bedroom. \par \par \par problem 1A: Fe Anemia - PRN\par -f/u Dr. Boyce for infusion\par \par problem 2: allergic rhinitis \par -continue Astelin (.15) 1 sniff each nostril BID or \par -add Xyzal 5 mg QHS \par - Recommended Navage saline \par -continue to use nasal saline\par -followed by Flonase 1 sniff each nostril BID \par -followed by Olopatadine 1 sniff each nostril (0.6)\par -can use Boroleum ointment PRN\par -X-lear QD PRN \par -Environmental measures for allergies were encouraged including mattress and pillow cover, air purifier, and environmental controls.\par \par problem 3: GERD\par -continue to use Dexilant 60 mg BID (before meals)\par -continue Pepcid 40 mg before bed \par -restart use of Carafate PRN\par -discussed Rule of 2's; pt should avoid eating too much; too fast; too spicy; too lousy; less than two hours before bed \par -Things to avoid including overeating, spicy foods, tight clothing, eating within three hours of bed, this list is not all inclusive. \par -For treatment of reflux, possible options discussed including diet control, H2 blockers, PPIs, as well as coating motility agents discussed as treatment options. Timing of meals and proximity of last meal to sleep were discussed. If symptoms persist, a formal gastrointestinal evaluation is needed.\par \par problem 4: obesity (improved)\par -Recommend "Muniq" OTC Supplement for weight loss, energy levels, and blood sugar levels \par -Weight loss, exercise, and diet control were discussed and are highly encouraged. Treatment options were given such as, aqua therapy, and contacting a nutritionist. Recommended to use the elliptical, stationary bike, less use of treadmill. Obesity is associated with worsening asthma, shortness of breath, and potential for cardiac disease, diabetes, and other underlying medical conditions. \par \par problem 5: mild JOSHUA\par -s/p HSS - move to HSS if needed\par -dental device (Osbaldo)\par -her recent sleep study found mild OSAS- recommended a Dental Device, the names Dr. Roblero is recommended for her based on her location \par -Sleep apnea is associated with adverse clinical consequences which an affect most organ systems. Cardiovascular disease risk includes arrhythmias, atrial fibrillation, hypertension, coronary artery disease, and stroke. Metabolic disorders include diabetes type 2, non-alcoholic fatty liver disease. Mood disorder especially depression; and cognitive decline especially in the elderly. Associations with chronic reflux/Chavis’s esophagus some but not all inclusive.\par -Reasons include arousal consistent with hypopnea; respiratory events most prominent in REM sleep or supine position; therefore sleep staging and body position are important for accurate diagnosis and estimation of AHI.\par -Treatment options discussed including CPAP/BiPAP machine, oral appliance, ProVent therapy, Oxy-Aid by Respitec, new technologies, or positional sleep.Recommended use of the CPAP machine for moderate (AHI >15), moderate to severe (AHI 15-30) and severe patients (AHI > 30). Recommended weight loss which can reduce AHI especially in weight loss of greater than 5% of BMI. Positional sleep is recommended in those with low AHI, low-moderate MBI, and younger age. For severe sleep apnea, the hypoglossal nerve stimulator was recommended as well. \par \par problem 6: poor sleep\par - Recommended either Dr. Herbie Roblero or Dr. Yamila Albarran for a dental device vs. "Excite", Epsom salt bath\par -recommending Sleep Guard\par -recommending pt wear sunglasses 30min before bed\par -Good sleep hygiene was encouraged including avoiding watching television an hour before bed, keeping caffeine at a low, avoiding reading, television, or anything, in bed, no drinking any liquids three hours before bedtime, and only getting into bed when tired and ready for sleep\par \par problem 7: sicca\par -recommended to use Mouth Kote / Evoxac 30 mg QAM\par \par Problem 8: Health Maintenance/COVID19 Precautions \par -s/p Covid-19 vaccine Pfizer x4\par - Clean your hands often. Wash your hands often with soap and water for at least 20 seconds, especially after blowing your nose, coughing, or sneezing, or having been in a public place.\par - If soap and water are not available, use a hand  that contains at least 60% alcohol.\par - To the extent possible, avoid touching high-touch surfaces in public places - elevator buttons, door handles, handrails, handshaking with people, etc. Use a tissue or your sleeve to cover your hand or finger if you must touch something.\par - Wash your hands after touching surfaces in public places.\par - Avoid touching your face, nose, eyes, etc.\par - Clean and disinfect your home to remove germs: practice routine cleaning of frequently touched surfaces (for example: tables, doorknobs, light switches, handles, desks, toilets, faucets, sinks & cell phones)\par - Avoid crowds, especially in poorly ventilated spaces. Your risk of exposure to respiratory viruses like COVID-19 may increase in crowded, closed-in settings with little air circulation if there are people in the crowd who are sick. All patients are recommended to practice social distancing and stay at least 6 feet away from others. \par - Avoid all non-essential travel including plane trips, and especially avoid embarking on cruise ships.\par -If COVID-19 is spreading in your community, take extra measures to put distance between yourself and other people to further reduce your risk of being exposed to this new virus.\par -Stay home as much as possible.\par - Consider ways of getting food brought to your house through family, social, or commercial networks\par -Be aware that the virus has been known to live in the air up to 3 hours post exposure, cardboard up to 24 hours post exposure, copper up to 4 hours post exposure, steel and plastic up to 2-3 days post exposure. Risk of transmission from these surfaces are affected by many variables.\par COVID-19 precautionary Immune Support Recommendations:\par -OTC Vitamin C 500mg BID \par -OTC Quercetin 250-500mg BID \par -OTC Zinc 75-100mg per day \par -OTC Melatonin 1 or 2mg a night \par -OTC Vitamin D 1-4000mg per day \par -OTC Tonic Water 8oz per day\par -Water 0.5-1 gallon per day\par Asthma and COVID19:\par You need to make sure your asthma is under control. This often requires the use of inhaled corticosteroids (and sometimes oral corticosteroids). Inhaled corticosteroids do not likely reduce your immune system’s ability to fight infections, but oral corticosteroids may. It is important to use the steps above to protect yourself to limit your exposure to any respiratory virus. \par \par Problem 9:health maintenance \par -Sauna Detox \par -Recommended Functional medicine follow up with Dr. Sejal Myrick\par -s/p flu shot 2022\par -recommended strep pneumonia vaccines: Prevnar-13 vaccine, followed by Pneumo vaccine 23 one year following\par -recommended early intervention for URIs\par -recommended regular osteoporosis evaluations\par -recommended early dermatological evaluations\par -recommended after the age of 50 to the age of 70, colonoscopy every 5 years \par F/U in 4 months\par She is encouraged to call with any changes, concerns, or questions.

## 2023-05-08 ENCOUNTER — RX RENEWAL (OUTPATIENT)
Age: 63
End: 2023-05-08

## 2023-05-17 ENCOUNTER — APPOINTMENT (OUTPATIENT)
Dept: ORTHOPEDIC SURGERY | Facility: CLINIC | Age: 63
End: 2023-05-17
Payer: COMMERCIAL

## 2023-05-17 VITALS — WEIGHT: 210 LBS | BODY MASS INDEX: 33.75 KG/M2 | HEIGHT: 66 IN

## 2023-05-17 PROCEDURE — 99213 OFFICE O/P EST LOW 20 MIN: CPT

## 2023-05-17 NOTE — DATA REVIEWED
[I independently reviewed and interpreted images and report] : I independently reviewed and interpreted images and report

## 2023-05-17 NOTE — PHYSICAL EXAM
[Right] : right knee [NL (0)] : extension 0 degrees [5___] : quadriceps 5[unfilled]/5 [Left] : left knee [] : non-antalgic [FreeTextEntry3] : anterior knee abrasion healed\par medial swelling [TWNoteComboBox7] : flexion 110 degrees

## 2023-05-17 NOTE — PROCEDURE
[Large Joint Injection] : Large joint injection [Right] : of the right [Knee] : knee [Pain] : pain [Inflammation] : inflammation [X-ray evidence of Osteoarthritis on this or prior visit] : x-ray evidence of Osteoarthritis on this or prior visit [Alcohol] : alcohol [Ethyl Chloride sprayed topically] : ethyl chloride sprayed topically [Sterile technique used] : sterile technique used [___ cc    6mg] :  Betamethasone (Celestone) ~Vcc of 6mg [___ cc    1%] : Lidocaine ~Vcc of 1%  [___ cc    0.25%] : Bupivacaine (Marcaine) ~Vcc of 0.25%  [Effusion] : effusion [] : Patient tolerated procedure well [Call if redness, pain or fever occur] : call if redness, pain or fever occur [Apply ice for 15min out of every hour for the next 12-24 hours as tolerated] : apply ice for 15 minutes out of every hour for the next 12-24 hours as tolerated [Patient was advised to rest the joint(s) for ____ days] : patient was advised to rest the joint(s) for [unfilled] days [Previous OTC use and PT nontherapeutic] : patient has tried OTC's including aspirin, Ibuprofen, Aleve, etc or prescription NSAIDS, and/or exercises at home and/or physical therapy without satisfactory response [Patient had decreased mobility in the joint] : patient had decreased mobility in the joint [Risks, benefits, alternatives discussed / Verbal consent obtained] : the risks benefits, and alternatives have been discussed, and verbal consent was obtained [Prior failure or difficult injection] : prior failure or difficult injection [All ultrasound images have been permanently captured and stored accordingly in our picture archiving and communication system] : All ultrasound images have been permanently captured and stored accordingly in our picture archiving and communication system [Visualization of the needle and placement of injection was performed without complication] : visualization of the needle and placement of injection was performed without complication [de-identified] : 13cc [de-identified] : blood tinged synovial fluid

## 2023-05-17 NOTE — HISTORY OF PRESENT ILLNESS
[Gradual] : gradual [Localized] : localized [Injection therapy] : injection therapy [Stairs] : stairs [de-identified] : Patient Complaint - NI garcia ankles. Fell down stairs 1/14/18. [facility] in [place] in boot R a\par ankle. Prior ankle recon.\par 1/22/18: ORIF L joann\par 1/31/18: f/u L ankle, NWB splint, Some tingling and pain to the foot.\par 2/7/18: f/u L ankle, NWB in bivalved cast. Seen by Dr Copeland as cast was too tight. improved immediatel\par Dry skin and altered sensation to 3,4 toes. Had infusion 2/5/18. Clustrophobic\par 2/16/18: f/u L ankle, continued pain and claustrophobia in the cast\par 2/28/18: f/u L ankle, NWB in CAM boot, flying tomorrow. Noted oozing last night (was at show) No f/c. T\par 3/14/18: f/u L ankle, continued swelling. continued pain to the dorsal foot. PT/HEP. Euflexxa #1 b/l knee\par 3/23/18: Euflexxa # 2 bilateral knees\par 3/28/18: Euflexxa # 3 bilateral knees\par 4/11/18 f/u garcia knees. Had god response to injections. Foot pain/burning persists. Request med r/n\par 5/2/18 f/u L ankle EMG pos for neuropathy\par 5/16/18 f/u L foot. Pain worse after packing up in [place]\par 6/6/18 f/u L foot sl improved\par 6/20/18: f/u L foot. Here to discuss emg results. Upper studies reported nl. L foot and ankle swelling imp\par eval started Neurontin 400 qid w improved symptoms\par 10/18/18 f/u L foot and ankle. Had increased pain this am\par 11/8/18 Euflexxa #1 garcia knees\par 11/16/18: euflexxa # 2 bilateral knees\par 11/23/18: euflexxa # 3 bilateral knees\par 2/22/19: f/u L ankle, continued intermittent pain that is more medial, tingling persists. Also lateral lumps \par ankle\par 4/3/19 f/u L ankle Tingling persists. On Neurontin 600 6x/day, Cymbalta increasing 90 to 120\par 5/22/19 f/u L ankle HEP/PT Tolerating Cymbalta/Neurontin\par 1/8/20 f/u L ankle jarek medially. Burning pain persists\par 1/22/20 f/u L ankle Garcia knee Euflexxa #1\par 1/29/2020 f/u L ankle. Garcia knee Euflexxa #2\par 2/4/20 f/u LT ankle, B/L knee Euflexxa #3\par 6/10/20 f/u garcia knees Ant knee pain jarek stairs\par 7/17/20: Euflexxa #1 B/L knees\par 08/26/2022 pt is here to review the right knee mri results \par 7/24/20 Euflexxa #2 garcia knees\par 8/4/20: Euflexxa # 3 bilateral knees. better with stairs.\par 2/10/21 f/u garcia knees, had good response to injections Requ meds\par 2/17/21 Euflexxa #1 garcia knees\par 2/23/21: euflexxa # 2 garcia knees\par 3/2/21: euflexxa #3 bilateral knees. fell on the knees last night due to neuropathy\par 10/15/21: Pt here for fu on bilateral knees. There is continued pain. Euflexxa # 1 bilateral knees\par 10/22/21: bilateral knee Euflexxa #2\par 10/29/21: bilateral knee euflexxa #3\par 2/25/22: R lateral ankle pain and swelling since 12/2022. She denies injury. [place] \par Pain shoots into the lateral foot at times. She took Percocet the pain was so bad. Prior ankle fracture 2018\par 3/11/22: f/u R ankle to review the MRI. Symptoms the same.\par 3/17/22 f/u R ankle Leaving to [place] requ injection\par 6/30/22  f/uBil knees Euflexa #1; left knee worse\par 7/7/22  Euflexxa #2 garcia knee\par 7/15/22: Euflexxa # 3 bilateral knees\par \par 8/19/22: NI R knee twisting injury and fall on 8/17/22. Swelling and pain to the medial and anterior aspect of the knee. Pain with weightbearing. \par 8/26/22: f/u R knee to review the MRI. Right knee pain and swelling persists. She is using the brace\par 5/17/23  f/u garcia knees Had good response to visco in past.  Stairs challenging\par  [Right Leg] : right leg [Sudden] : sudden [5] : 5 [4] : 4 [Burning] : burning [Shooting] : shooting [Stabbing] : stabbing [Intermittent] : intermittent [Retired] : Work status: retired [] : Post Surgical Visit: no [FreeTextEntry1] : right knee  [FreeTextEntry5] : Pt recently tripped and fell on to her knee and felt a sudden onset of pain that has not gone away, pt is also having swelling in the area  [de-identified] : Dr. Wetzel [de-identified] : mri done at [facility]  [de-identified] : None  [de-identified] : self employed [de-identified] : 7/15/22

## 2023-05-26 ENCOUNTER — APPOINTMENT (OUTPATIENT)
Dept: ORTHOPEDIC SURGERY | Facility: CLINIC | Age: 63
End: 2023-05-26
Payer: COMMERCIAL

## 2023-05-26 VITALS — WEIGHT: 210 LBS | BODY MASS INDEX: 33.75 KG/M2 | HEIGHT: 66 IN

## 2023-05-26 PROCEDURE — 99212 OFFICE O/P EST SF 10 MIN: CPT | Mod: 25

## 2023-05-26 PROCEDURE — 20611 DRAIN/INJ JOINT/BURSA W/US: CPT | Mod: 50

## 2023-05-26 NOTE — PROCEDURE
[Large Joint Injection] : Large joint injection [Bilateral] : bilaterally of the [Knee] : knee [Pain] : pain [X-ray evidence of Osteoarthritis on this or prior visit] : x-ray evidence of Osteoarthritis on this or prior visit [Repeat series performed] : repeat series performed [Alcohol] : alcohol [Ethyl Chloride sprayed topically] : ethyl chloride sprayed topically [Sterile technique used] : sterile technique used [Euflexxa(20mg)] : 20mg of Euflexxa [#1] : series #1 [] : Patient tolerated procedure well [Call if redness, pain or fever occur] : call if redness, pain or fever occur [Apply ice for 15min out of every hour for the next 12-24 hours as tolerated] : apply ice for 15 minutes out of every hour for the next 12-24 hours as tolerated [Patient was advised to rest the joint(s) for ____ days] : patient was advised to rest the joint(s) for [unfilled] days [Previous OTC use and PT nontherapeutic] : patient has tried OTC's including aspirin, Ibuprofen, Aleve, etc or prescription NSAIDS, and/or exercises at home and/or physical therapy without satisfactory response [Patient had decreased mobility in the joint] : patient had decreased mobility in the joint [Risks, benefits, alternatives discussed / Verbal consent obtained] : the risks benefits, and alternatives have been discussed, and verbal consent was obtained [All ultrasound images have been permanently captured and stored accordingly in our picture archiving and communication system] : All ultrasound images have been permanently captured and stored accordingly in our picture archiving and communication system [Visualization of the needle and placement of injection was performed without complication] : visualization of the needle and placement of injection was performed without complication

## 2023-05-26 NOTE — HISTORY OF PRESENT ILLNESS
[5] : 5 [0] : 0 [Dull/Aching] : dull/aching [Localized] : localized [1] : 1 [Euflexxa] : Euflexxa [de-identified] : Patient Complaint - NI garcia ankles. Fell down stairs 1/14/18. [facility] [place] in boot R a\par ankle. Prior ankle recon.\par 1/22/18: ORIF L joann\par 1/31/18: f/u L ankle, NWB splint, Some tingling and pain to the foot.\par 2/7/18: f/u L ankle, NWB in bivalved cast. Seen by Dr Copeland as cast was too tight. improved immediatel\par Dry skin and altered sensation to 3,4 toes. Had infusion 2/5/18. Clustrophobic\par 2/16/18: f/u L ankle, continued pain and claustrophobia in the cast\par 2/28/18: f/u L ankle, NWB in CAM boot, flying tomorrow. Noted oozing last night (was at show) No f/c. T\par 3/14/18: f/u L ankle, continued swelling. continued pain to the dorsal foot. PT/HEP. Euflexxa #1 b/l knee\par 3/23/18: Euflexxa # 2 bilateral knees\par 3/28/18: Euflexxa # 3 bilateral knees\par 4/11/18 f/u garcia knees. Had god response to injections. Foot pain/burning persists. Request med r/n\par 5/2/18 f/u L ankle EMG pos for neuropathy\par 5/16/18 f/u L foot. Pain worse after packing up in [place]\par 6/6/18 f/u L foot sl improved\par 6/20/18: f/u L foot. Here to discuss emg results. Upper studies reported nl. L foot and ankle swelling imp\par eval started Neurontin 400 qid w improved symptoms\par 10/18/18 f/u L foot and ankle. Had increased pain this am\par 11/8/18 Euflexxa #1 garcia knees\par 11/16/18: euflexxa # 2 bilateral knees\par 11/23/18: euflexxa # 3 bilateral knees\par 2/22/19: f/u L ankle, continued intermittent pain that is more medial, tingling persists. Also lateral lumps \par ankle\par 4/3/19 f/u L ankle Tingling persists. On Neurontin 600 6x/day, Cymbalta increasing 90 to 120\par 5/22/19 f/u L ankle HEP/PT Tolerating Cymbalta/Neurontin\par 1/8/20 f/u L ankle jarek medially. Burning pain persists\par 1/22/20 f/u L ankle Garcia knee Euflexxa #1\par 1/29/2020 f/u L ankle. Garcia knee Euflexxa #2\par 2/4/20 f/u LT ankle, B/L knee Euflexxa #3\par 6/10/20 f/u garcia knees Ant knee pain jarek stairs\par 7/17/20: Euflexxa #1 B/L knees\par 08/26/2022 pt is here to review the right knee mri results \par 7/24/20 Euflexxa #2 garcia knees\par 8/4/20: Euflexxa # 3 bilateral knees. better with stairs.\par 2/10/21 f/u garcia knees, had good response to injections Requ meds\par 2/17/21 Euflexxa #1 garcia knees\par 2/23/21: euflexxa # 2 garcia knees\par 3/2/21: euflexxa #3 bilateral knees. fell on the knees last night due to neuropathy\par 10/15/21: Pt here for fu on bilateral knees. There is continued pain. Euflexxa # 1 bilateral knees\par 10/22/21: bilateral knee Euflexxa #2\par 10/29/21: bilateral knee euflexxa #3\par 2/25/22: R lateral ankle pain and swelling since 12/2022. She denies injury. [place] \par Pain shoots into the lateral foot at times. She took Percocet the pain was so bad. Prior ankle fracture 2018\par 3/11/22: f/u R ankle to review the MRI. Symptoms the same.\par 3/17/22 f/u R ankle Leaving to [place] requ injection\par 6/30/22  f/uBil knees Euflexa #1; left knee worse\par 7/7/22  Euflexxa #2 garcia knee\par 7/15/22: Euflexxa # 3 bilateral knees\par \par 8/19/22: NI R knee twisting injury and fall on 8/17/22. Swelling and pain to the medial and anterior aspect of the knee. Pain with weightbearing. \par 8/26/22: f/u R knee to review the MRI. Right knee pain and swelling persists. She is using the brace\par 5/17/23  f/u garcia knees Had good response to visco in past.  Stairs challenging\par 5/26/23: f/u B/L knees; Euflexxa # 1 B/L knees  [] : no [FreeTextEntry1] : B/L Knees [FreeTextEntry3] : N/A Chronic [FreeTextEntry5] : 62 y./o RHD F eval B/L Knees. Pt presents with many years if atraumatic pain due to  HX of OA. Prior TX of EUflexxa INJ with good relief. Euflexxa #1 today  [de-identified] : B/L Knees [de-identified] : HA

## 2023-05-31 ENCOUNTER — APPOINTMENT (OUTPATIENT)
Dept: ORTHOPEDIC SURGERY | Facility: CLINIC | Age: 63
End: 2023-05-31
Payer: COMMERCIAL

## 2023-05-31 VITALS — WEIGHT: 210 LBS | BODY MASS INDEX: 33.75 KG/M2 | HEIGHT: 66 IN

## 2023-05-31 PROCEDURE — 99212 OFFICE O/P EST SF 10 MIN: CPT | Mod: 25

## 2023-05-31 PROCEDURE — 20611 DRAIN/INJ JOINT/BURSA W/US: CPT | Mod: 50

## 2023-05-31 NOTE — PHYSICAL EXAM
[NL (0)] : extension 0 degrees [5___] : quadriceps 5[unfilled]/5 [Left] : left knee [Right] : right knee [] : non-antalgic [FreeTextEntry3] : anterior knee abrasion healed\par medial swelling [TWNoteComboBox7] : flexion 110 degrees

## 2023-05-31 NOTE — HISTORY OF PRESENT ILLNESS
[3] : 3 [Dull/Aching] : dull/aching [Localized] : localized [Injection therapy] : injection therapy [Stairs] : stairs [2] : 2 [Euflexxa] : Euflexxa [de-identified] : Patient Complaint - NI garcia ankles. Fell down stairs 1/14/18. Seen @ [facility] in [place] in boot R a\par ankle. Prior ankle recon.\par 1/22/18: ORIF L joann\par 1/31/18: f/u L ankle, NWB splint, Some tingling and pain to the foot.\par 2/7/18: f/u L ankle, NWB in bivalved cast. Seen by Dr Copeland as cast was too tight. improved immediatel\par Dry skin and altered sensation to 3,4 toes. Had infusion 2/5/18. Clustrophobic\par 2/16/18: f/u L ankle, continued pain and claustrophobia in the cast\par 2/28/18: f/u L ankle, NWB in CAM boot, flying tomorrow. Noted oozing last night (was at show) No f/c. T\par 3/14/18: f/u L ankle, continued swelling. continued pain to the dorsal foot. PT/HEP. Euflexxa #1 b/l knee\par 3/23/18: Euflexxa # 2 bilateral knees\par 3/28/18: Euflexxa # 3 bilateral knees\par 4/11/18 f/u garcia knees. Had god response to injections. Foot pain/burning persists. Request med r/n\par 5/2/18 f/u L ankle EMG pos for neuropathy\par 5/16/18 f/u L foot. Pain worse after packing up in [place]\par 6/6/18 f/u L foot sl improved\par 6/20/18: f/u L foot. Here to discuss emg results. Upper studies reported nl. L foot and ankle swelling imp\par eval started Neurontin 400 qid w improved symptoms\par 10/18/18 f/u L foot and ankle. Had increased pain this am\par 11/8/18 Euflexxa #1 garcia knees\par 11/16/18: euflexxa # 2 bilateral knees\par 11/23/18: euflexxa # 3 bilateral knees\par 2/22/19: f/u L ankle, continued intermittent pain that is more medial, tingling persists. Also lateral lumps \par ankle\par 4/3/19 f/u L ankle Tingling persists. On Neurontin 600 6x/day, Cymbalta increasing 90 to 120\par 5/22/19 f/u L ankle HEP/PT Tolerating Cymbalta/Neurontin\par 1/8/20 f/u L ankle jarek medially. Burning pain persists\par 1/22/20 f/u L ankle Garcia knee Euflexxa #1\par 1/29/2020 f/u L ankle. Garcia knee Euflexxa #2\par 2/4/20 f/u LT ankle, B/L knee Euflexxa #3\par 6/10/20 f/u garcia knees Ant knee pain jarek stairs\par 7/17/20: Euflexxa #1 B/L knees\par 08/26/2022 pt is here to review the right knee mri results \par 7/24/20 Euflexxa #2 garcia knees\par 8/4/20: Euflexxa # 3 bilateral knees. better with stairs.\par 2/10/21 f/u garcia knees, had good response to injections Requ meds\par 2/17/21 Euflexxa #1 garcia knees\par 2/23/21: euflexxa # 2 garcia knees\par 3/2/21: euflexxa #3 bilateral knees. fell on the knees last night due to neuropathy\par 10/15/21: Pt here for fu on bilateral knees. There is continued pain. Euflexxa # 1 bilateral knees\par 10/22/21: bilateral knee Euflexxa #2\par 10/29/21: bilateral knee euflexxa #3\par 2/25/22: R lateral ankle pain and swelling since 12/2022. [place] denies injury. [place] Pain shoots into the lateral foot at times. She took Percocet the pain was so bad. Prior ankle fracture 2018\par 3/11/22: f/u R ankle to review the MRI. Symptoms the same.\par 3/17/22 f/u R ankle Leaving to [place] requ injection\par 6/30/22  f/uBil knees Euflexa #1; left knee worse\par 7/7/22  Euflexxa #2 garcia knee\par 7/15/22: Euflexxa # 3 bilateral knees\par \par 8/19/22: NI R knee twisting injury and fall on 8/17/22. Swelling and pain to the medial and anterior aspect of the knee. Pain with weightbearing. \par 8/26/22: f/u R knee to review the MRI. Right knee pain and swelling persists. She is using the brace\par 5/17/23  f/u garcia knees Had good response to visco in past.  Stairs challenging\par 5/26/23: f/u B/L knees; Euflexxa # 1 B/L knees \par 5/31/23: f/u B/L knees for Euflexxa #2, no new issues [] : no [FreeTextEntry1] : B/L Knees [FreeTextEntry3] : N/A Chronic [FreeTextEntry5] : 62 y./o RHD F eval B/L Knees. Pt presents with many years if atraumatic pain due to  HX of OA. Prior TX of EUflexxa INJ with good relief. Euflexxa #1 today  [de-identified] : 5-26-23 [de-identified] : B/L Knees [de-identified] : HA

## 2023-05-31 NOTE — PROCEDURE
[Large Joint Injection] : Large joint injection [Bilateral] : bilaterally of the [Knee] : knee [Pain] : pain [Inflammation] : inflammation [X-ray evidence of Osteoarthritis on this or prior visit] : x-ray evidence of Osteoarthritis on this or prior visit [Alcohol] : alcohol [Ethyl Chloride sprayed topically] : ethyl chloride sprayed topically [Sterile technique used] : sterile technique used [Euflexxa(20mg)] : 20mg of Euflexxa [#2] : series #2 [Call if redness, pain or fever occur] : call if redness, pain or fever occur [Apply ice for 15min out of every hour for the next 12-24 hours as tolerated] : apply ice for 15 minutes out of every hour for the next 12-24 hours as tolerated [Patient was advised to rest the joint(s) for ____ days] : patient was advised to rest the joint(s) for [unfilled] days [Previous OTC use and PT nontherapeutic] : patient has tried OTC's including aspirin, Ibuprofen, Aleve, etc or prescription NSAIDS, and/or exercises at home and/or physical therapy without satisfactory response [Patient had decreased mobility in the joint] : patient had decreased mobility in the joint [Prior failure or difficult injection] : prior failure or difficult injection [Risks, benefits, alternatives discussed / Verbal consent obtained] : the risks benefits, and alternatives have been discussed, and verbal consent was obtained [All ultrasound images have been permanently captured and stored accordingly in our picture archiving and communication system] : All ultrasound images have been permanently captured and stored accordingly in our picture archiving and communication system [Visualization of the needle and placement of injection was performed without complication] : visualization of the needle and placement of injection was performed without complication

## 2023-06-04 ENCOUNTER — RX RENEWAL (OUTPATIENT)
Age: 63
End: 2023-06-04

## 2023-06-07 ENCOUNTER — APPOINTMENT (OUTPATIENT)
Dept: ORTHOPEDIC SURGERY | Facility: CLINIC | Age: 63
End: 2023-06-07
Payer: COMMERCIAL

## 2023-06-07 VITALS — WEIGHT: 210 LBS | HEIGHT: 66 IN | BODY MASS INDEX: 33.75 KG/M2

## 2023-06-07 PROCEDURE — 20611 DRAIN/INJ JOINT/BURSA W/US: CPT | Mod: NC

## 2023-06-07 PROCEDURE — 99212 OFFICE O/P EST SF 10 MIN: CPT | Mod: 25

## 2023-06-07 NOTE — HISTORY OF PRESENT ILLNESS
[2] : 2 [Dull/Aching] : dull/aching [Localized] : localized [Injection therapy] : injection therapy [Stairs] : stairs [3] : 3 [Euflexxa] : Euflexxa [de-identified] : Patient Complaint - NI garcia ankles. Fell down stairs 1/14/18. Seen @ [facility] in [place] in boot R a\par ankle. Prior ankle recon.\par 1/22/18: ORIF L joann\par 1/31/18: f/u L ankle, NWB splint, Some tingling and pain to the foot.\par 2/7/18: f/u L ankle, NWB in bivalved cast. Seen by Dr Copeland as cast was too tight. improved immediatel\par Dry skin and altered sensation to 3,4 toes. Had infusion 2/5/18. Clustrophobic\par 2/16/18: f/u L ankle, continued pain and claustrophobia in the cast\par 2/28/18: f/u L ankle, NWB in CAM boot, flying tomorrow. Noted oozing last night (was at show) No f/c. T\par 3/14/18: f/u L ankle, continued swelling. continued pain to the dorsal foot. PT/HEP. Euflexxa #1 b/l knee\par 3/23/18: Euflexxa # 2 bilateral knees\par 3/28/18: Euflexxa # 3 bilateral knees\par 4/11/18 f/u garcia knees. Had god response to injections. Foot pain/burning persists. Request med r/n\par 5/2/18 f/u L ankle EMG pos for neuropathy\par 5/16/18 f/u L foot. Pain worse after packing up in [place]\par 6/6/18 f/u L foot sl improved\par 6/20/18: f/u L foot. Here to discuss emg results. Upper studies reported nl. L foot and ankle swelling imp\par eval started Neurontin 400 qid w improved symptoms\par 10/18/18 f/u L foot and ankle. Had increased pain this am\par 11/8/18 Euflexxa #1 garcia knees\par 11/16/18: euflexxa # 2 bilateral knees\par 11/23/18: euflexxa # 3 bilateral knees\par 2/22/19: f/u L ankle, continued intermittent pain that is more medial, tingling persists. Also lateral lumps \par ankle\par 4/3/19 f/u L ankle Tingling persists. On Neurontin 600 6x/day, Cymbalta increasing 90 to 120\par 5/22/19 f/u L ankle HEP/PT Tolerating Cymbalta/Neurontin\par 1/8/20 f/u L ankle jarek medially. Burning pain persists\par 1/22/20 f/u L ankle Garcia knee Euflexxa #1\par 1/29/2020 f/u L ankle. Garcia knee Euflexxa #2\par 2/4/20 f/u LT ankle, B/L knee Euflexxa #3\par 6/10/20 f/u garcia knees Ant knee pain jarek stairs\par 7/17/20: Euflexxa #1 B/L knees\par 08/26/2022 pt is here to review the right knee mri results \par 7/24/20 Euflexxa #2 garcia knees\par 8/4/20: Euflexxa # 3 bilateral knees. better with stairs.\par 2/10/21 f/u garcia knees, had good response to injections Requ meds\par 2/17/21 Euflexxa #1 garcia knees\par 2/23/21: euflexxa # 2 garcia knees\par 3/2/21: euflexxa #3 bilateral knees. fell on the knees last night due to neuropathy\par 10/15/21: Pt here for fu on bilateral knees. There is continued pain. Euflexxa # 1 bilateral knees\par 10/22/21: bilateral knee Euflexxa #2\par 10/29/21: bilateral knee euflexxa #3\par 2/25/22: R lateral ankle pain and swelling since 12/2022. [place] denies injury. [place] Pain shoots into the lateral foot at times. She took Percocet the pain was so bad. Prior ankle fracture 2018\par 3/11/22: f/u R ankle to review the MRI. Symptoms the same.\par 3/17/22 f/u R ankle Leaving to [place] requ injection\par 6/30/22  f/uBil knees Euflexa #1; left knee worse\par 7/7/22  Euflexxa #2 garcia knee\par 7/15/22: Euflexxa # 3 bilateral knees\par \par 8/19/22: NI R knee twisting injury and fall on 8/17/22. Swelling and pain to the medial and anterior aspect of the knee. Pain with weightbearing. \par 8/26/22: f/u R knee to review the MRI. Right knee pain and swelling persists. She is using the brace\par 5/17/23  f/u garcia knees Had good response to visco in past.  Stairs challenging\par 5/26/23: f/u B/L knees; Euflexxa # 1 B/L knees \par 5/31/23: f/u B/L knees for Euflexxa #2, no new issues\par 6/7/23: f/u b/l knees Euflexxa #3  [] : no [FreeTextEntry1] : B/L Knees [FreeTextEntry3] : N/A Chronic [FreeTextEntry5] : 62 y./o RHD F eval B/L Knees. Pt presents with many years if atraumatic pain due to  HX of OA. Prior TX of EUflexxa INJ with good relief. Euflexxa #1 today  [de-identified] : 5-31-23 [de-identified] : B/L Knees [de-identified] : HA

## 2023-06-07 NOTE — PROCEDURE
[Large Joint Injection] : Large joint injection [Bilateral] : bilaterally of the [Knee] : knee [Pain] : pain [Inflammation] : inflammation [X-ray evidence of Osteoarthritis on this or prior visit] : x-ray evidence of Osteoarthritis on this or prior visit [Alcohol] : alcohol [Ethyl Chloride sprayed topically] : ethyl chloride sprayed topically [Sterile technique used] : sterile technique used [Euflexxa(20mg)] : 20mg of Euflexxa [Call if redness, pain or fever occur] : call if redness, pain or fever occur [Apply ice for 15min out of every hour for the next 12-24 hours as tolerated] : apply ice for 15 minutes out of every hour for the next 12-24 hours as tolerated [Patient was advised to rest the joint(s) for ____ days] : patient was advised to rest the joint(s) for [unfilled] days [Previous OTC use and PT nontherapeutic] : patient has tried OTC's including aspirin, Ibuprofen, Aleve, etc or prescription NSAIDS, and/or exercises at home and/or physical therapy without satisfactory response [Patient had decreased mobility in the joint] : patient had decreased mobility in the joint [Risks, benefits, alternatives discussed / Verbal consent obtained] : the risks benefits, and alternatives have been discussed, and verbal consent was obtained [Prior failure or difficult injection] : prior failure or difficult injection [All ultrasound images have been permanently captured and stored accordingly in our picture archiving and communication system] : All ultrasound images have been permanently captured and stored accordingly in our picture archiving and communication system [Visualization of the needle and placement of injection was performed without complication] : visualization of the needle and placement of injection was performed without complication [#3] : series #3

## 2023-06-20 ENCOUNTER — NON-APPOINTMENT (OUTPATIENT)
Age: 63
End: 2023-06-20

## 2023-08-16 ENCOUNTER — APPOINTMENT (OUTPATIENT)
Dept: PULMONOLOGY | Facility: CLINIC | Age: 63
End: 2023-08-16
Payer: COMMERCIAL

## 2023-08-16 VITALS
RESPIRATION RATE: 16 BRPM | DIASTOLIC BLOOD PRESSURE: 64 MMHG | OXYGEN SATURATION: 96 % | HEIGHT: 66 IN | HEART RATE: 91 BPM | BODY MASS INDEX: 33.75 KG/M2 | SYSTOLIC BLOOD PRESSURE: 122 MMHG | WEIGHT: 210 LBS

## 2023-08-16 DIAGNOSIS — J30.89 OTHER ALLERGIC RHINITIS: ICD-10-CM

## 2023-08-16 PROCEDURE — 95012 NITRIC OXIDE EXP GAS DETER: CPT

## 2023-08-16 PROCEDURE — 94010 BREATHING CAPACITY TEST: CPT

## 2023-08-16 PROCEDURE — 99214 OFFICE O/P EST MOD 30 MIN: CPT | Mod: 25

## 2023-08-16 NOTE — PHYSICAL EXAM
[No Acute Distress] : no acute distress [Normal Oropharynx] : normal oropharynx [III] : Mallampati Class: III [Normal Appearance] : normal appearance [No Neck Mass] : no neck mass [Normal Rate/Rhythm] : normal rate/rhythm [Normal S1, S2] : normal s1, s2 [No Murmurs] : no murmurs [No Resp Distress] : no resp distress [Clear to Auscultation Bilaterally] : clear to auscultation bilaterally [No Abnormalities] : no abnormalities [Benign] : benign [Normal Gait] : normal gait [No Clubbing] : no clubbing [No Cyanosis] : no cyanosis [No Edema] : no edema [FROM] : FROM [Normal Color/ Pigmentation] : normal color/ pigmentation [No Focal Deficits] : no focal deficits [Oriented x3] : oriented x3 [Normal Affect] : normal affect [TextBox_2] : ow rosacea on face [TextBox_68] : I:E 1:3; mild expiratory wheeze

## 2023-08-16 NOTE — PROCEDURE
[FreeTextEntry1] : PFTs revealed normal flows; FEV1 was 3.18  L, which is 113.2% of predicted; normal flow volume loop.  PFTs were performed to evaluate for SOB   FENO was 9 ; a normal value being less than 25 Fractional exhaled nitric oxide (FENO) is regarded as a simple, noninvasive method for assessing eosinophilic airway inflammation. Produced by a variety of cells within the lung, nitric oxide (NO) concentrations are generally low in healthy individuals. However, high concentrations of NO appear to be involved in nonspecific host defense mechanisms and chronic inflammatory diseases such as asthma. The American Thoracic Society (ATS) therefore has recommended using FENO to aid in the diagnosis and monitoring of eosinophilic airway inflammation and asthma, and for identifying steroid responsive individuals whose chronic respiratory symptoms may be caused by airway inflammation.

## 2023-08-16 NOTE — ADDENDUM
[FreeTextEntry1] : Documented by Adalberto Fried acting as a scribe for Dr. Len Gonzalez on 08/16/2023. All medical record entries made by the Scribe were at my, Dr. Len Gonzalez's, direction and personally dictated by me on 08/16/2023. I have reviewed the chart and agree that the record accurately reflects my personal performance of the history, physical exam, assessment and plan. I have also personally directed, reviewed, and agree with the discharge instructions.

## 2023-08-16 NOTE — ASSESSMENT
[FreeTextEntry1] : Ms. Dickinson is 61 yo has a h/o asthma, iron deficiency anemia,  allergy, GERS, OSAS, s/p PNA, SLE / Sicca. ?Dx of SLE; s/p COVID-19 vaccine x4 - Active SLE (Dissih / Porges); untreated JOSHUA (DD)  Her shortness of breath is multifactorial due to: -obesity -out of shape -asthma -? CAD -poor breathing mechanics  -(Anemia)  problem 1: asthma (active) -continue to use Singulair 10 mg before bed -continue Xopenex 1.25 with the nebulizer TID -continue Ventolin rescue inhaler 2 inhalations before exercise, Q6H  -continue Symbicort (160) 2 inhalations BID -Asthma is believed to be caused by inherited (genetic) and environmental factor, but its exact cause is unknown. Asthma may be triggered by allergens, lung infections, or irritants in the air. Asthma triggers are different for each person -Inhaler technique reviewed as well as oral hygiene techniques reviewed with patient. Avoidance of cold air, extremes of temperature, rescue inhaler should be used before exercise. Order of medication reviewed with patient. Recommended use of a cool mist humidifier in the bedroom.    problem 1A: Fe Anemia - PRN -f/u Dr. Boyce for infusion  problem 2: allergic rhinitis  -continue Astelin (.15) 1 sniff each nostril BID or  -add Xyzal 5 mg QHS  - Recommended Navage saline  -followed by Flonase 1 sniff each nostril BID  -followed by Olopatadine 1 sniff each nostril (0.6) -can use Boroleum ointment PRN -X-lear QD PRN  -Environmental measures for allergies were encouraged including mattress and pillow cover, air purifier, and environmental controls.  problem 3: GERD -continue to use Dexilant 60 mg BID (before meals) -continue Pepcid 40 mg before bed  -restart use of Carafate PRN -discussed Rule of 2's; pt should avoid eating too much; too fast; too spicy; too lousy; less than two hours before bed  -Things to avoid including overeating, spicy foods, tight clothing, eating within three hours of bed, this list is not all inclusive.  -For treatment of reflux, possible options discussed including diet control, H2 blockers, PPIs, as well as coating motility agents discussed as treatment options. Timing of meals and proximity of last meal to sleep were discussed. If symptoms persist, a formal gastrointestinal evaluation is needed.  problem 4: obesity (improved) -Recommend "Muniq" OTC Supplement for weight loss, energy levels, and blood sugar levels  -Weight loss, exercise, and diet control were discussed and are highly encouraged. Treatment options were given such as, aqua therapy, and contacting a nutritionist. Recommended to use the elliptical, stationary bike, less use of treadmill. Obesity is associated with worsening asthma, shortness of breath, and potential for cardiac disease, diabetes, and other underlying medical conditions.   problem 5: mild JOSHUA- (refit) -recommended eXciteOSA therapy device -s/p HSS - move to HSS if needed -dental device (Osbaldo) -her recent sleep study found mild OSAS- recommended a Dental Device, the names Dr. Roblero is recommended for her based on her location  -Sleep apnea is associated with adverse clinical consequences which an affect most organ systems. Cardiovascular disease risk includes arrhythmias, atrial fibrillation, hypertension, coronary artery disease, and stroke. Metabolic disorders include diabetes type 2, non-alcoholic fatty liver disease. Mood disorder especially depression; and cognitive decline especially in the elderly. Associations with chronic reflux/Chavis's esophagus some but not all inclusive. -Reasons include arousal consistent with hypopnea; respiratory events most prominent in REM sleep or supine position; therefore sleep staging and body position are important for accurate diagnosis and estimation of AHI. -Treatment options discussed including CPAP/BiPAP machine, oral appliance, ProVent therapy, Oxy-Aid by Respitec, new technologies, or positional sleep.Recommended use of the CPAP machine for moderate (AHI >15), moderate to severe (AHI 15-30) and severe patients (AHI > 30). Recommended weight loss which can reduce AHI especially in weight loss of greater than 5% of BMI. Positional sleep is recommended in those with low AHI, low-moderate MBI, and younger age. For severe sleep apnea, the hypoglossal nerve stimulator was recommended as well.   problem 6: poor sleep - Recommended either Dr. Herbie Roblero or Dr. Yamila Albarran for a dental device vs. "Excite", Epsom salt bath -recommending Sleep Guard -recommending pt wear sunglasses 30min before bed -Good sleep hygiene was encouraged including avoiding watching television an hour before bed, keeping caffeine at a low, avoiding reading, television, or anything, in bed, no drinking any liquids three hours before bedtime, and only getting into bed when tired and ready for sleep  problem 7: sicca -recommended to use Mouth Kote / Evoxac 30 mg QAM  Problem 8: Health Maintenance/COVID19 Precautions  -s/p Covid-19 vaccine Pfizer x4 - Clean your hands often. Wash your hands often with soap and water for at least 20 seconds, especially after blowing your nose, coughing, or sneezing, or having been in a public place. - If soap and water are not available, use a hand  that contains at least 60% alcohol. - To the extent possible, avoid touching high-touch surfaces in public places - elevator buttons, door handles, handrails, handshaking with people, etc. Use a tissue or your sleeve to cover your hand or finger if you must touch something. - Wash your hands after touching surfaces in public places. - Avoid touching your face, nose, eyes, etc. - Clean and disinfect your home to remove germs: practice routine cleaning of frequently touched surfaces (for example: tables, doorknobs, light switches, handles, desks, toilets, faucets, sinks & cell phones) - Avoid crowds, especially in poorly ventilated spaces. Your risk of exposure to respiratory viruses like COVID-19 may increase in crowded, closed-in settings with little air circulation if there are people in the crowd who are sick. All patients are recommended to practice social distancing and stay at least 6 feet away from others.  - Avoid all non-essential travel including plane trips, and especially avoid embarking on cruise ships. -If COVID-19 is spreading in your community, take extra measures to put distance between yourself and other people to further reduce your risk of being exposed to this new virus. -Stay home as much as possible. - Consider ways of getting food brought to your house through family, social, or commercial networks -Be aware that the virus has been known to live in the air up to 3 hours post exposure, cardboard up to 24 hours post exposure, copper up to 4 hours post exposure, steel and plastic up to 2-3 days post exposure. Risk of transmission from these surfaces are affected by many variables. COVID-19 precautionary Immune Support Recommendations: -OTC Vitamin C 500mg BID  -OTC Quercetin 250-500mg BID  -OTC Zinc 75-100mg per day  -OTC Melatonin 1 or 2mg a night  -OTC Vitamin D 1-4000mg per day  -OTC Tonic Water 8oz per day -Water 0.5-1 gallon per day Asthma and COVID19: You need to make sure your asthma is under control. This often requires the use of inhaled corticosteroids (and sometimes oral corticosteroids). Inhaled corticosteroids do not likely reduce your immune system's ability to fight infections, but oral corticosteroids may. It is important to use the steps above to protect yourself to limit your exposure to any respiratory virus.   Problem 9:health maintenance  -recommended RSV vaccine -Sauna Detox  -Recommended Functional medicine follow up with Dr. Sejal Myrick -s/p flu shot 2022 -recommended strep pneumonia vaccines: Prevnar-13 vaccine, followed by Pneumo vaccine 23 one year following -recommended early intervention for URIs -recommended regular osteoporosis evaluations -recommended early dermatological evaluations -recommended after the age of 50 to the age of 70, colonoscopy every 5 years  F/U in 4 months She is encouraged to call with any changes, concerns, or questions.

## 2023-08-16 NOTE — HISTORY OF PRESENT ILLNESS
[FreeTextEntry1] : Ms. Dickinson is a 62 year old female presenting to the office for a follow up visit for asthma, allergies, GERD, JOSHUA, PNA history, poor sleep, postnasal drip, sicca, sleep disordered breathing, and shortness of breath. Her chief complaint is   -she notes feeling generally well - she notes SOB onset 3 days ago - she notes producing mucus  -she notes globus pharynges -she notes taking Prevacid due to insurance no longer covering dexilant - she notes having Lupus  -she notes dysphonia - she notes poor diet which has led to weight gain  - she notes muscularly feeling weak  -she denies exercising -she notes feeling depressed  - she notes snoring  -she notes her Lupus is not controlled  -she denies any headaches, nausea, emesis, fever, chills, sweats, chest pain, chest pressure, coughing, wheezing, palpitations, diarrhea, constipation, dysphagia, vertigo, leg swelling, itchy eyes, itchy ears, heartburn, reflux, or sour taste in the mouth.

## 2023-08-25 ENCOUNTER — RX RENEWAL (OUTPATIENT)
Age: 63
End: 2023-08-25

## 2023-08-25 ENCOUNTER — APPOINTMENT (OUTPATIENT)
Dept: PULMONOLOGY | Facility: CLINIC | Age: 63
End: 2023-08-25
Payer: COMMERCIAL

## 2023-08-25 DIAGNOSIS — R05.9 COUGH, UNSPECIFIED: ICD-10-CM

## 2023-08-25 DIAGNOSIS — R09.82 POSTNASAL DRIP: ICD-10-CM

## 2023-08-25 DIAGNOSIS — J01.90 ACUTE SINUSITIS, UNSPECIFIED: ICD-10-CM

## 2023-08-25 PROCEDURE — 99213 OFFICE O/P EST LOW 20 MIN: CPT | Mod: 95

## 2023-08-25 RX ORDER — FLUTICASONE FUROATE, UMECLIDINIUM BROMIDE AND VILANTEROL TRIFENATATE 100; 62.5; 25 UG/1; UG/1; UG/1
100-62.5-25 POWDER RESPIRATORY (INHALATION)
Qty: 3 | Refills: 1 | Status: DISCONTINUED | COMMUNITY
Start: 2019-07-25 | End: 2023-08-25

## 2023-08-25 RX ORDER — LEVALBUTEROL TARTRATE 45 UG/1
45 AEROSOL, METERED ORAL
Qty: 3 | Refills: 2 | Status: DISCONTINUED | COMMUNITY
Start: 2017-07-11 | End: 2023-08-25

## 2023-08-25 RX ORDER — LEVALBUTEROL HYDROCHLORIDE 1.25 MG/3ML
1.25 SOLUTION RESPIRATORY (INHALATION)
Qty: 90 | Refills: 2 | Status: DISCONTINUED | COMMUNITY
Start: 2017-02-22 | End: 2023-08-25

## 2023-08-25 RX ORDER — OLOPATADINE HYDROCHLORIDE 665 UG/1
0.6 SPRAY, METERED NASAL
Qty: 3 | Refills: 1 | Status: DISCONTINUED | COMMUNITY
Start: 2017-02-22 | End: 2023-08-25

## 2023-08-25 RX ORDER — LEVALBUTEROL HYDROCHLORIDE 1.25 MG/3ML
1.25 SOLUTION RESPIRATORY (INHALATION)
Qty: 750 | Refills: 1 | Status: DISCONTINUED | COMMUNITY
Start: 2017-07-24 | End: 2023-08-25

## 2023-08-25 RX ORDER — OLOPATADINE HYDROCHLORIDE 2 MG/ML
0.2 SOLUTION OPHTHALMIC
Qty: 3 | Refills: 1 | Status: DISCONTINUED | COMMUNITY
Start: 2022-08-25 | End: 2023-08-25

## 2023-08-25 RX ORDER — AMOXICILLIN AND CLAVULANATE POTASSIUM 875; 125 MG/1; MG/1
875-125 TABLET, COATED ORAL
Qty: 28 | Refills: 0 | Status: DISCONTINUED | COMMUNITY
Start: 2022-12-20 | End: 2023-08-25

## 2023-08-25 RX ORDER — BUDESONIDE 0.5 MG/2ML
0.5 INHALANT ORAL TWICE DAILY
Qty: 2 | Refills: 3 | Status: ACTIVE | COMMUNITY
Start: 2022-12-20 | End: 1900-01-01

## 2023-08-25 RX ORDER — AMOXICILLIN AND CLAVULANATE POTASSIUM 500; 125 MG/1; MG/1
500-125 TABLET, FILM COATED ORAL
Qty: 20 | Refills: 0 | Status: DISCONTINUED | COMMUNITY
Start: 2022-10-04 | End: 2023-08-25

## 2023-08-25 RX ORDER — OLOPATADINE HYDROCHLORIDE 665 UG/1
0.6 SPRAY, METERED NASAL
Qty: 3 | Refills: 1 | Status: DISCONTINUED | COMMUNITY
Start: 2022-08-26 | End: 2023-08-25

## 2023-08-25 RX ORDER — LEVALBUTEROL HYDROCHLORIDE 0.63 MG/3ML
0.63 SOLUTION RESPIRATORY (INHALATION)
Qty: 1 | Refills: 3 | Status: DISCONTINUED | COMMUNITY
Start: 2022-09-21 | End: 2023-08-25

## 2023-08-25 RX ORDER — LEVALBUTEROL TARTRATE 45 UG/1
45 AEROSOL, METERED ORAL
Qty: 1 | Refills: 3 | Status: DISCONTINUED | COMMUNITY
Start: 2018-10-30 | End: 2023-08-25

## 2023-08-25 RX ORDER — LEVALBUTEROL TARTRATE 45 UG/1
45 AEROSOL, METERED ORAL
Qty: 3 | Refills: 1 | Status: DISCONTINUED | COMMUNITY
Start: 2019-07-25 | End: 2023-08-25

## 2023-08-25 RX ORDER — IPRATROPIUM BROMIDE AND ALBUTEROL SULFATE 2.5; .5 MG/3ML; MG/3ML
0.5-2.5 (3) SOLUTION RESPIRATORY (INHALATION)
Qty: 3 | Refills: 0 | Status: DISCONTINUED | COMMUNITY
Start: 2018-10-30 | End: 2023-08-25

## 2023-08-25 RX ORDER — AZELASTINE HYDROCHLORIDE 137 UG/1
0.1 SPRAY, METERED NASAL TWICE DAILY
Qty: 1 | Refills: 3 | Status: ACTIVE | COMMUNITY
Start: 2023-08-25 | End: 1900-01-01

## 2023-08-25 RX ORDER — NIRMATRELVIR AND RITONAVIR 300-100 MG
20 X 150 MG & KIT ORAL
Qty: 1 | Refills: 0 | Status: DISCONTINUED | COMMUNITY
Start: 2022-09-21 | End: 2023-08-25

## 2023-08-30 ENCOUNTER — APPOINTMENT (OUTPATIENT)
Dept: PULMONOLOGY | Facility: CLINIC | Age: 63
End: 2023-08-30
Payer: COMMERCIAL

## 2023-08-30 VITALS
SYSTOLIC BLOOD PRESSURE: 120 MMHG | HEART RATE: 90 BPM | HEIGHT: 66 IN | RESPIRATION RATE: 16 BRPM | TEMPERATURE: 97.6 F | DIASTOLIC BLOOD PRESSURE: 68 MMHG | OXYGEN SATURATION: 98 % | WEIGHT: 210 LBS | BODY MASS INDEX: 33.75 KG/M2

## 2023-08-30 PROCEDURE — 99214 OFFICE O/P EST MOD 30 MIN: CPT

## 2023-08-30 RX ORDER — PROMETHAZINE HYDROCHLORIDE AND DEXTROMETHORPHAN HYDROBROMIDE ORAL SOLUTION 15; 6.25 MG/5ML; MG/5ML
6.25-15 SOLUTION ORAL
Qty: 473 | Refills: 1 | Status: ACTIVE | COMMUNITY
Start: 2023-08-30 | End: 1900-01-01

## 2023-08-30 NOTE — ASSESSMENT
[FreeTextEntry1] : Ms. Dickinson is 61 yo has a h/o asthma, iron deficiency anemia,  allergy, GERS, OSAS, s/p PNA, SLE / Sicca. ?Dx of SLE; s/p COVID-19 vaccine x4 - Active SLE (Dissik / Porges); untreated JOSHUA (DD) - now w/ Acute Bronchitis / Active Asthma  Her shortness of breath is multifactorial due to: -obesity -out of shape -asthma -? CAD -poor breathing mechanics  -(Anemia)  problem 1: asthma (active) -continue to use Singulair 10 mg before bed -continue Xopenex 1.25 with the nebulizer TID -continue Ventolin rescue inhaler 2 inhalations before exercise, Q6H  -continue Symbicort (160) 2 inhalations BID -Asthma is believed to be caused by inherited (genetic) and environmental factor, but its exact cause is unknown. Asthma may be triggered by allergens, lung infections, or irritants in the air. Asthma triggers are different for each person -Inhaler technique reviewed as well as oral hygiene techniques reviewed with patient. Avoidance of cold air, extremes of temperature, rescue inhaler should be used before exercise. Order of medication reviewed with patient. Recommended use of a cool mist humidifier in the bedroom.   Problem 1A: Acute Bronchitis 8/2023 -add Prednisone 20 mg x 7 days, 10 mg x 7 days -add Promethazine DM [facility]  Information sheet given to the patient to be reviewed, this medication is never to be used without consulting the prescribing physician. Proper dietary restraint is necessary specifically salt containing foods, if any reaction may occur should be reported. You have a clinical scenario most c/q acute bronchitis the etiology of which is unknown but empiric antibiotics are indicated. Hydration, mucolytics including mucinex, robitussin and the like are indicated. Cough controlling agents will be needed.  problem 1B: Fe Anemia - PRN -f/u Dr. Boyce for infusion  problem 2: allergic rhinitis  -continue Astelin (.15) 1 sniff each nostril BID or  -add Xyzal 5 mg QHS  - Recommended Navage saline  -followed by Flonase 1 sniff each nostril BID  -followed by Olopatadine 1 sniff each nostril (0.6) -can use Boroleum ointment PRN -X-lear QD PRN  -Environmental measures for allergies were encouraged including mattress and pillow cover, air purifier, and environmental controls.  problem 3: GERD -continue to use Dexilant 60 mg BID (before meals) -continue Pepcid 40 mg before bed  -restart use of Carafate PRN -discussed Rule of 2's; pt should avoid eating too much; too fast; too spicy; too lousy; less than two hours before bed  -Things to avoid including overeating, spicy foods, tight clothing, eating within three hours of bed, this list is not all inclusive.  -For treatment of reflux, possible options discussed including diet control, H2 blockers, PPIs, as well as coating motility agents discussed as treatment options. Timing of meals and proximity of last meal to sleep were discussed. If symptoms persist, a formal gastrointestinal evaluation is needed.  problem 4: obesity (improved) -Recommend "Muniq" OTC Supplement for weight loss, energy levels, and blood sugar levels  -Weight loss, exercise, and diet control were discussed and are highly encouraged. Treatment options were given such as, aqua therapy, and contacting a nutritionist. Recommended to use the elliptical, stationary bike, less use of treadmill. Obesity is associated with worsening asthma, shortness of breath, and potential for cardiac disease, diabetes, and other underlying medical conditions.   problem 5: mild JOSHUA- (refit) -recommended eXciteOSA therapy device -s/p HSS - move to HSS if needed -dental device (Osbaldo) -her recent sleep study found mild OSAS- recommended a Dental Device, the names Dr. Roblero is recommended for her based on her location  -Sleep apnea is associated with adverse clinical consequences which an affect most organ systems. Cardiovascular disease risk includes arrhythmias, atrial fibrillation, hypertension, coronary artery disease, and stroke. Metabolic disorders include diabetes type 2, non-alcoholic fatty liver disease. Mood disorder especially depression; and cognitive decline especially in the elderly. Associations with chronic reflux/Chavis's esophagus some but not all inclusive. -Reasons include arousal consistent with hypopnea; respiratory events most prominent in REM sleep or supine position; therefore sleep staging and body position are important for accurate diagnosis and estimation of AHI. -Treatment options discussed including CPAP/BiPAP machine, oral appliance, ProVent therapy, Oxy-Aid by Respitec, new technologies, or positional sleep.Recommended use of the CPAP machine for moderate (AHI >15), moderate to severe (AHI 15-30) and severe patients (AHI > 30). Recommended weight loss which can reduce AHI especially in weight loss of greater than 5% of BMI. Positional sleep is recommended in those with low AHI, low-moderate MBI, and younger age. For severe sleep apnea, the hypoglossal nerve stimulator was recommended as well.   problem 6: poor sleep - Recommended either Dr. Herbie Roblero or Dr. Yamila Albarran for a dental device vs. "Excite", Epsom salt bath -recommending Sleep Guard -recommending pt wear sunglasses 30min before bed -Good sleep hygiene was encouraged including avoiding watching television an hour before bed, keeping caffeine at a low, avoiding reading, television, or anything, in bed, no drinking any liquids three hours before bedtime, and only getting into bed when tired and ready for sleep  problem 7: sicca -recommended to use Mouth Kote / Evoxac 30 mg QAM  Problem 8: Health Maintenance/COVID19 Precautions  -s/p Covid-19 vaccine Pfizer x4 - Clean your hands often. Wash your hands often with soap and water for at least 20 seconds, especially after blowing your nose, coughing, or sneezing, or having been in a public place. - If soap and water are not available, use a hand  that contains at least 60% alcohol. - To the extent possible, avoid touching high-touch surfaces in public places - elevator buttons, door handles, handrails, handshaking with people, etc. Use a tissue or your sleeve to cover your hand or finger if you must touch something. - Wash your hands after touching surfaces in public places. - Avoid touching your face, nose, eyes, etc. - Clean and disinfect your home to remove germs: practice routine cleaning of frequently touched surfaces (for example: tables, doorknobs, light switches, handles, desks, toilets, faucets, sinks & cell phones) - Avoid crowds, especially in poorly ventilated spaces. Your risk of exposure to respiratory viruses like COVID-19 may increase in crowded, closed-in settings with little air circulation if there are people in the crowd who are sick. All patients are recommended to practice social distancing and stay at least 6 feet away from others.  - Avoid all non-essential travel including plane trips, and especially avoid embarking on cruise ships. -If COVID-19 is spreading in your community, take extra measures to put distance between yourself and other people to further reduce your risk of being exposed to this new virus. -Stay home as much as possible. - Consider ways of getting food brought to your house through family, social, or commercial networks -Be aware that the virus has been known to live in the air up to 3 hours post exposure, cardboard up to 24 hours post exposure, copper up to 4 hours post exposure, steel and plastic up to 2-3 days post exposure. Risk of transmission from these surfaces are affected by many variables. COVID-19 precautionary Immune Support Recommendations: -OTC Vitamin C 500mg BID  -OTC Quercetin 250-500mg BID  -OTC Zinc 75-100mg per day  -OTC Melatonin 1 or 2mg a night  -OTC Vitamin D 1-4000mg per day  -OTC Tonic Water 8oz per day -Water 0.5-1 gallon per day Asthma and COVID19: You need to make sure your asthma is under control. This often requires the use of inhaled corticosteroids (and sometimes oral corticosteroids). Inhaled corticosteroids do not likely reduce your immune system's ability to fight infections, but oral corticosteroids may. It is important to use the steps above to protect yourself to limit your exposure to any respiratory virus.   Problem 9:health maintenance  -recommended RSV vaccine -Sauna Detox  -Recommended Functional medicine follow up with Dr. Sejal Myrick -s/p flu shot 2022 -recommended strep pneumonia vaccines: Prevnar-13 vaccine, followed by Pneumo vaccine 23 one year following -recommended early intervention for URIs -recommended regular osteoporosis evaluations -recommended early dermatological evaluations -recommended after the age of 50 to the age of 70, colonoscopy every 5 years  F/U in 4 months She is encouraged to call with any changes, concerns, or questions.

## 2023-08-30 NOTE — ADDENDUM
[FreeTextEntry1] : Documented by Reggie Richard acting as a scribe for Dr. Len Gonzalez on 08/30/2023.   All medical record entries made by the Scribe were at my, Dr. Len Gonzalez's, direction and personally dictated by me on 08/30/2023. I have reviewed the chart and agree that the record accurately reflects my personal performance of the history, physical exam, assessment and plan. I have also personally directed, reviewed, and agree with the discharge instructions.

## 2023-08-30 NOTE — HISTORY OF PRESENT ILLNESS
[FreeTextEntry1] : Ms. Dickinson is a 62 year old female presenting to the office for a follow up visit for asthma, allergies, GERD, JOSHUA, PNA history, poor sleep, postnasal drip, sicca, sleep disordered breathing, and shortness of breath. Her chief complaint is   - she notes having coughing fits starting last Friday  - she notes having chills and sweats - she notes having chest pressure - she notes her sinuses feel better but are still dripping - she notes not producing any mucus - she notes her cough is worse in the night and she hasn't slept - she notes taking abx twice a day, Levalbuterol and Budesonide via nebulizer, other inhalers, and nasal spray  - she notes drinking a lot of decaf coffee - she notes taking Amitriptyline   -she denies any headaches, nausea, emesis, fever, chills, sweats, chest pain, wheezing, palpitations, constipation, diarrhea, vertigo, dysphagia, heartburn, reflux, itchy eyes, itchy ears, leg swelling, leg pain, arthralgias, myalgias, or sour taste in the mouth.

## 2023-08-30 NOTE — PHYSICAL EXAM
[No Acute Distress] : no acute distress [Normal Oropharynx] : normal oropharynx [III] : Mallampati Class: III [Normal Appearance] : normal appearance [No Neck Mass] : no neck mass [Normal Rate/Rhythm] : normal rate/rhythm [Normal S1, S2] : normal s1, s2 [No Murmurs] : no murmurs [No Resp Distress] : no resp distress [Clear to Auscultation Bilaterally] : clear to auscultation bilaterally [No Abnormalities] : no abnormalities [Benign] : benign [Normal Gait] : normal gait [No Clubbing] : no clubbing [No Cyanosis] : no cyanosis [No Edema] : no edema [FROM] : FROM [Normal Color/ Pigmentation] : normal color/ pigmentation [No Focal Deficits] : no focal deficits [Oriented x3] : oriented x3 [Normal Affect] : normal affect [TextBox_2] : ow  [TextBox_68] : I:E 1:3; mild expiratory wheeze

## 2023-08-31 NOTE — ASSESSMENT
[FreeTextEntry1] : Ms. Dickinson is a 62 year old female with history of asthma, allergies, GERD, JOSHUA, PNA history, poor sleep, postnasal drip, sicca, sleep disordered breathing, and shortness of breath who now presents via video for an acute visit.   1. Acute sinusitis -add Augmentin 875mg BID X 10 days.   2. asthma  -restart Xopenex 1.25 with the nebulizer TID -restart Budesonide via nebulizer BID -restart Symbicort (160) 2 inhalations BID -continue Singulair 10 mg before bed  3.PND -start azelastine nasal spray 2 spray BID  Patient to follow up with Dr. Gonzalez as scheduled. Patient to call with further questions and concerns. Patient verbalizes understanding of care plan and is agreeable.

## 2023-08-31 NOTE — REASON FOR VISIT
[Home] : at home, [unfilled] , at the time of the visit. [Medical Office: ([facility])___] : at the medical office located in  [Patient] : the patient [Follow-Up] : a follow-up visit [Asthma] : asthma [TextBox_44] : allergies, GERD, postnasal drip

## 2023-08-31 NOTE — REVIEW OF SYSTEMS
[Nasal Congestion] : nasal congestion [Postnasal Drip] : postnasal drip [Sinus Problems] : sinus problems [Cough] : cough [Chest Tightness] : chest tightness [Negative] : Psychiatric [Dry Eyes] : no dry eyes [Ear Disturbance] : no ear disturbance [Epistaxis] : no epistaxis [Sore Throat] : no sore throat [Eye Irritation] : no eye irritation [Dry Mouth] : no dry mouth [Mouth Ulcers] : no mouth ulcers [Poor Dentition] : no poor dentition [Edentulous] : no edentulous [Hemoptysis] : no hemoptysis [Frequent URIs] : no frequent URIs [Sputum] : no sputum [Dyspnea] : no dyspnea [Pleuritic Pain] : no pleuritic pain [Wheezing] : no wheezing [A.M. Dry Mouth] : no a.m. dry mouth [SOB on Exertion] : no sob on exertion

## 2023-10-10 ENCOUNTER — TRANSCRIPTION ENCOUNTER (OUTPATIENT)
Age: 63
End: 2023-10-10

## 2023-10-10 RX ORDER — LEVOCETIRIZINE DIHYDROCHLORIDE 5 MG/1
5 TABLET ORAL
Qty: 90 | Refills: 1 | Status: ACTIVE | COMMUNITY
Start: 2022-08-25 | End: 1900-01-01

## 2023-11-24 ENCOUNTER — TRANSCRIPTION ENCOUNTER (OUTPATIENT)
Age: 63
End: 2023-11-24

## 2023-11-28 RX ORDER — BUDESONIDE AND FORMOTEROL FUMARATE DIHYDRATE 160; 4.5 UG/1; UG/1
160-4.5 AEROSOL RESPIRATORY (INHALATION) TWICE DAILY
Qty: 30.6 | Refills: 1 | Status: DISCONTINUED | COMMUNITY
Start: 2020-07-08 | End: 2023-11-28

## 2023-11-28 RX ORDER — BUDESONIDE AND FORMOTEROL FUMARATE DIHYDRATE 160; 4.5 UG/1; UG/1
160-4.5 AEROSOL RESPIRATORY (INHALATION) TWICE DAILY
Qty: 3 | Refills: 1 | Status: DISCONTINUED | COMMUNITY
Start: 2022-08-25 | End: 2023-11-28

## 2023-12-05 ENCOUNTER — APPOINTMENT (OUTPATIENT)
Dept: PULMONOLOGY | Facility: CLINIC | Age: 63
End: 2023-12-05
Payer: COMMERCIAL

## 2023-12-05 VITALS
OXYGEN SATURATION: 98 % | RESPIRATION RATE: 16 BRPM | TEMPERATURE: 97.1 F | WEIGHT: 220 LBS | SYSTOLIC BLOOD PRESSURE: 122 MMHG | BODY MASS INDEX: 35.36 KG/M2 | HEIGHT: 66 IN | HEART RATE: 86 BPM | DIASTOLIC BLOOD PRESSURE: 70 MMHG

## 2023-12-05 DIAGNOSIS — J20.9 ACUTE BRONCHITIS, UNSPECIFIED: ICD-10-CM

## 2023-12-05 DIAGNOSIS — K21.9 GASTRO-ESOPHAGEAL REFLUX DISEASE W/OUT ESOPHAGITIS: ICD-10-CM

## 2023-12-05 DIAGNOSIS — M35.00 SICCA SYNDROME, UNSPECIFIED: ICD-10-CM

## 2023-12-05 DIAGNOSIS — E66.9 OBESITY, UNSPECIFIED: ICD-10-CM

## 2023-12-05 DIAGNOSIS — R06.02 SHORTNESS OF BREATH: ICD-10-CM

## 2023-12-05 DIAGNOSIS — J45.40 MODERATE PERSISTENT ASTHMA, UNCOMPLICATED: ICD-10-CM

## 2023-12-05 DIAGNOSIS — Z72.820 SLEEP DEPRIVATION: ICD-10-CM

## 2023-12-05 DIAGNOSIS — G47.33 OBSTRUCTIVE SLEEP APNEA (ADULT) (PEDIATRIC): ICD-10-CM

## 2023-12-05 DIAGNOSIS — J45.901 ACUTE BRONCHITIS, UNSPECIFIED: ICD-10-CM

## 2023-12-05 DIAGNOSIS — J45.909 UNSPECIFIED ASTHMA, UNCOMPLICATED: ICD-10-CM

## 2023-12-05 PROCEDURE — 99214 OFFICE O/P EST MOD 30 MIN: CPT | Mod: 25

## 2023-12-05 PROCEDURE — 95012 NITRIC OXIDE EXP GAS DETER: CPT

## 2023-12-05 RX ORDER — AMOXICILLIN AND CLAVULANATE POTASSIUM 875; 125 MG/1; MG/1
875-125 TABLET, COATED ORAL
Qty: 20 | Refills: 0 | Status: DISCONTINUED | COMMUNITY
Start: 2023-08-25 | End: 2023-12-05

## 2023-12-05 RX ORDER — AZELASTINE HYDROCHLORIDE 137 UG/1
0.1 SPRAY, METERED NASAL TWICE DAILY
Qty: 3 | Refills: 1 | Status: ACTIVE | COMMUNITY
Start: 2023-12-05 | End: 1900-01-01

## 2023-12-05 RX ORDER — BUDESONIDE, GLYCOPYRROLATE, AND FORMOTEROL FUMARATE 160; 9; 4.8 UG/1; UG/1; UG/1
160-9-4.8 AEROSOL, METERED RESPIRATORY (INHALATION)
Qty: 3 | Refills: 1 | Status: ACTIVE | COMMUNITY
Start: 2023-12-05 | End: 1900-01-01

## 2023-12-05 RX ORDER — LEVALBUTEROL HYDROCHLORIDE 1.25 MG/3ML
1.25 SOLUTION RESPIRATORY (INHALATION) EVERY 8 HOURS
Qty: 3 | Refills: 3 | Status: ACTIVE | COMMUNITY
Start: 2023-08-16 | End: 1900-01-01

## 2023-12-08 RX ORDER — BUDESONIDE AND FORMOTEROL FUMARATE DIHYDRATE 160; 4.5 UG/1; UG/1
160-4.5 AEROSOL RESPIRATORY (INHALATION) TWICE DAILY
Qty: 1 | Refills: 2 | Status: ACTIVE | COMMUNITY
Start: 2023-11-28 | End: 1900-01-01

## 2023-12-14 RX ORDER — PREDNISONE 10 MG/1
10 TABLET ORAL
Qty: 21 | Refills: 0 | Status: ACTIVE | COMMUNITY
Start: 2023-08-30 | End: 1900-01-01

## 2024-01-09 ENCOUNTER — APPOINTMENT (OUTPATIENT)
Dept: RADIOLOGY | Facility: CLINIC | Age: 64
End: 2024-01-09
Payer: COMMERCIAL

## 2024-01-09 ENCOUNTER — OUTPATIENT (OUTPATIENT)
Dept: OUTPATIENT SERVICES | Facility: HOSPITAL | Age: 64
LOS: 1 days | End: 2024-01-09
Payer: COMMERCIAL

## 2024-01-09 DIAGNOSIS — R06.02 SHORTNESS OF BREATH: ICD-10-CM

## 2024-01-09 PROCEDURE — 71046 X-RAY EXAM CHEST 2 VIEWS: CPT

## 2024-01-09 PROCEDURE — 71046 X-RAY EXAM CHEST 2 VIEWS: CPT | Mod: 26

## 2024-01-10 ENCOUNTER — TRANSCRIPTION ENCOUNTER (OUTPATIENT)
Age: 64
End: 2024-01-10

## 2024-01-10 LAB
RAPID RVP RESULT: DETECTED
SARS-COV-2 RNA PNL RESP NAA+PROBE: DETECTED

## 2024-01-10 RX ORDER — NIRMATRELVIR AND RITONAVIR 300-100 MG
20 X 150 MG & KIT ORAL
Qty: 1 | Refills: 0 | Status: ACTIVE | COMMUNITY
Start: 2024-01-10 | End: 1900-01-01

## 2024-02-22 ENCOUNTER — APPOINTMENT (OUTPATIENT)
Dept: ORTHOPEDIC SURGERY | Facility: CLINIC | Age: 64
End: 2024-02-22
Payer: COMMERCIAL

## 2024-02-22 VITALS — WEIGHT: 219 LBS | HEIGHT: 66 IN | BODY MASS INDEX: 35.2 KG/M2

## 2024-02-22 DIAGNOSIS — M25.559 PAIN IN UNSPECIFIED HIP: ICD-10-CM

## 2024-02-22 DIAGNOSIS — M70.62 TROCHANTERIC BURSITIS, LEFT HIP: ICD-10-CM

## 2024-02-22 DIAGNOSIS — M51.36 OTHER INTERVERTEBRAL DISC DEGENERATION, LUMBAR REGION: ICD-10-CM

## 2024-02-22 DIAGNOSIS — M16.12 UNILATERAL PRIMARY OSTEOARTHRITIS, LEFT HIP: ICD-10-CM

## 2024-02-22 DIAGNOSIS — M47.26 OTHER SPONDYLOSIS WITH RADICULOPATHY, LUMBAR REGION: ICD-10-CM

## 2024-02-22 PROCEDURE — 73502 X-RAY EXAM HIP UNI 2-3 VIEWS: CPT

## 2024-02-22 PROCEDURE — 20610 DRAIN/INJ JOINT/BURSA W/O US: CPT | Mod: LT

## 2024-02-22 PROCEDURE — 99214 OFFICE O/P EST MOD 30 MIN: CPT | Mod: 25

## 2024-02-22 PROCEDURE — 72100 X-RAY EXAM L-S SPINE 2/3 VWS: CPT

## 2024-02-22 RX ORDER — METHYLPREDNISOLONE 4 MG/1
4 TABLET ORAL
Qty: 1 | Refills: 0 | Status: ACTIVE | COMMUNITY
Start: 2024-02-22 | End: 1900-01-01

## 2024-02-22 NOTE — PROCEDURE
[Large Joint Injection] : Large joint injection [Left] : of the left [Greater Trochanteric Bursa] : greater trochanteric bursa [Pain] : pain [Inflammation] : inflammation [Alcohol] : alcohol [Betadine] : betadine [Ethyl Chloride sprayed topically] : ethyl chloride sprayed topically [Sterile technique used] : sterile technique used [___ cc    0.5%] : Bupivacaine (Marcaine) ~Vcc of 0.5%  [___ cc    40mg] : Triamcinolone (Kenalog) ~Vcc of 40 mg  [Apply ice for 15min out of every hour for the next 12-24 hours as tolerated] : apply ice for 15 minutes out of every hour for the next 12-24 hours as tolerated [Call if redness, pain or fever occur] : call if redness, pain or fever occur [Previous OTC use and PT nontherapeutic] : patient has tried OTC's including aspirin, Ibuprofen, Aleve, etc or prescription NSAIDS, and/or exercises at home and/or physical therapy without satisfactory response [Risks, benefits, alternatives discussed / Verbal consent obtained] : the risks benefits, and alternatives have been discussed, and verbal consent was obtained [Patient had decreased mobility in the joint] : patient had decreased mobility in the joint

## 2024-02-22 NOTE — PHYSICAL EXAM
[] : ambulation with cane [Facet arthropathy] : Facet arthropathy [Disc space narrowing] : Disc space narrowing [Left] : left hip with pelvis [Mild arthritis (Tonnis Grade 1)] : Mild arthritis (Tonnis Grade 1)

## 2024-02-22 NOTE — HISTORY OF PRESENT ILLNESS
[Sudden] : sudden [7] : 7 [2] : 2 [Stabbing] : stabbing [Constant] : constant [Rest] : rest [Meds] : meds [Walking] : walking [Full time] : Work status: full time [de-identified] : 2/22/24: 64yo F with left lateral hip and groin pain since 2/18/24 after she had been putting dishes away. States her hip gave out. Hx of lumbar pain, but had never been treated. Denies prior hip issues. Amb with cane today, which she had not been using prior to onset of pain.  [] : no [FreeTextEntry1] : Left hip/ Left groin  [FreeTextEntry3] : 2/18/24 [FreeTextEntry5] : Patient was putting away dishes and hip gave out  [FreeTextEntry7] : left groin

## 2024-02-27 ENCOUNTER — RX RENEWAL (OUTPATIENT)
Age: 64
End: 2024-02-27

## 2024-02-27 RX ORDER — LEVALBUTEROL TARTRATE 45 UG/1
45 AEROSOL, METERED ORAL
Qty: 45 | Refills: 3 | Status: ACTIVE | COMMUNITY
Start: 2022-08-25 | End: 1900-01-01

## 2024-03-12 ENCOUNTER — RESULT REVIEW (OUTPATIENT)
Age: 64
End: 2024-03-12

## 2024-03-18 ENCOUNTER — TRANSCRIPTION ENCOUNTER (OUTPATIENT)
Age: 64
End: 2024-03-18

## 2024-03-27 ENCOUNTER — APPOINTMENT (OUTPATIENT)
Dept: ORTHOPEDIC SURGERY | Facility: CLINIC | Age: 64
End: 2024-03-27
Payer: COMMERCIAL

## 2024-03-27 VITALS — HEIGHT: 66 IN | WEIGHT: 219 LBS | BODY MASS INDEX: 35.2 KG/M2

## 2024-03-27 PROCEDURE — 99213 OFFICE O/P EST LOW 20 MIN: CPT

## 2024-03-27 NOTE — HISTORY OF PRESENT ILLNESS
[3] : 3 [0] : 0 [Dull/Aching] : dull/aching [Localized] : localized [Injection therapy] : injection therapy [Stairs] : stairs [de-identified] : Patient Complaint - NI garcia ankles. Fell down stairs 1/14/18. Seen @ [facility] in [place] in boot R a ankle. Prior ankle recon. 1/22/18: ORIF L joann 1/31/18: f/u L ankle, NWB splint, Some tingling and pain to the foot. 2/7/18: f/u L ankle, NWB in bivalved cast. Seen by Dr Copeland as cast was too tight. improved immediatel Dry skin and altered sensation to 3,4 toes. Had infusion 2/5/18. Clustrophobic 2/16/18: f/u L ankle, continued pain and claustrophobia in the cast 2/28/18: f/u L ankle, NWB in CAM boot, flying tomorrow. Noted oozing last night (was at show) No f/c. T 3/14/18: f/u L ankle, continued swelling. continued pain to the dorsal foot. PT/HEP. Euflexxa #1 b/l knee 3/23/18: Euflexxa # 2 bilateral knees 3/28/18: Euflexxa # 3 bilateral knees 4/11/18 f/u garcia knees. Had god response to injections. Foot pain/burning persists. Request med r/n 5/2/18 f/u L ankle EMG pos for neuropathy 5/16/18 f/u L foot. Pain worse after packing up in [place] 6/6/18 f/u L foot sl improved 6/20/18: f/u L foot. Here to discuss emg results. Upper studies reported nl. L foot and ankle swelling imp eval started Neurontin 400 qid w improved symptoms 10/18/18 f/u L foot and ankle. Had increased pain this am 11/8/18 Euflexxa #1 garcia knees 11/16/18: euflexxa # 2 bilateral knees 11/23/18: euflexxa # 3 bilateral knees 2/22/19: f/u L ankle, continued intermittent pain that is more medial, tingling persists. Also lateral lumps  ankle 4/3/19 f/u L ankle Tingling persists. On Neurontin 600 6x/day, Cymbalta increasing 90 to 120 5/22/19 f/u L ankle HEP/PT Tolerating Cymbalta/Neurontin 1/8/20 f/u L ankle jarek medially. Burning pain persists 1/22/20 f/u L ankle Garcia knee Euflexxa #1 1/29/2020 f/u L ankle. Garcia knee Euflexxa #2 2/4/20 f/u LT ankle, B/L knee Euflexxa #3 6/10/20 f/u garcia knees Ant knee pain jarek stairs 7/17/20: Euflexxa #1 B/L knees 08/26/2022 pt is here to review the right knee mri results  7/24/20 Euflexxa #2 garcia knees 8/4/20: Euflexxa # 3 bilateral knees. better with stairs. 2/10/21 f/u garcia knees, had good response to injections Requ meds 2/17/21 Euflexxa #1 garcia knees 2/23/21: euflexxa # 2 garcia knees 3/2/21: euflexxa #3 bilateral knees. fell on the knees last night due to neuropathy 10/15/21: Pt here for fu on bilateral knees. There is continued pain. Euflexxa # 1 bilateral knees 10/22/21: bilateral knee Euflexxa #2 10/29/21: bilateral knee euflexxa #3 2/25/22: R lateral ankle pain and swelling since 12/2022. She denies injury. [place]  Pain shoots into the lateral foot at times. She took Percocet the pain was so bad. Prior ankle fracture 2018 3/11/22: f/u R ankle to review the MRI. Symptoms the same. 3/17/22 f/u R ankle Leaving to [place] requ injection 6/30/22  f/uBil knees Euflexa #1; left knee worse 7/7/22  Euflexxa #2 garcia knee 7/15/22: Euflexxa # 3 bilateral knees  8/19/22: NI R knee twisting injury and fall on 8/17/22. Swelling and pain to the medial and anterior aspect of the knee. Pain with weightbearing.  8/26/22: f/u R knee to review the MRI. Right knee pain and swelling persists. She is using the brace 5/17/23  f/u garcia knees Had good response to visco in past.  Stairs challenging 5/26/23: f/u B/L knees; Euflexxa # 1 B/L knees  5/31/23: f/u B/L knees for Euflexxa #2, no new issues 6/7/23: f/u b/l knees Euflexxa #3  3/27/24  f/u Garcia knees  Had good response to last visco series [] : no [FreeTextEntry1] : B/L Knees [FreeTextEntry3] : N/A Chronic [de-identified] : move a certain way [FreeTextEntry5] : 62 y./o RHD F eval B/L Knees. Pt presents with many years if atraumatic pain due to  HX of OA. Prior TX of EUflexxa INJ with good relief. Euflexxa #1 today

## 2024-04-04 ENCOUNTER — APPOINTMENT (OUTPATIENT)
Dept: ORTHOPEDIC SURGERY | Facility: CLINIC | Age: 64
End: 2024-04-04

## 2024-04-10 ENCOUNTER — APPOINTMENT (OUTPATIENT)
Dept: ORTHOPEDIC SURGERY | Facility: CLINIC | Age: 64
End: 2024-04-10
Payer: COMMERCIAL

## 2024-04-10 VITALS — WEIGHT: 219 LBS | BODY MASS INDEX: 35.2 KG/M2 | HEIGHT: 66 IN

## 2024-04-10 PROCEDURE — 20611 DRAIN/INJ JOINT/BURSA W/US: CPT | Mod: 50

## 2024-04-10 PROCEDURE — 99212 OFFICE O/P EST SF 10 MIN: CPT | Mod: 25

## 2024-04-10 NOTE — HISTORY OF PRESENT ILLNESS
[1] : 2 [0] : 0 [Dull/Aching] : dull/aching [Localized] : localized [Stabbing] : stabbing [Injection therapy] : injection therapy [Stairs] : stairs [de-identified] : Patient Complaint - NI garcia ankles. Fell down stairs 1/14/18. Seen @ [facility] in [place] in boot R a ankle. Prior ankle recon. 1/22/18: ORIF L joann 1/31/18: f/u L ankle, NWB splint, Some tingling and pain to the foot. 2/7/18: f/u L ankle, NWB in bivalved cast. Seen by Dr Copeland as cast was too tight. improved immediatel Dry skin and altered sensation to 3,4 toes. Had infusion 2/5/18. Clustrophobic 2/16/18: f/u L ankle, continued pain and claustrophobia in the cast 2/28/18: f/u L ankle, NWB in CAM boot, flying tomorrow. Noted oozing last night (was at show) No f/c. T 3/14/18: f/u L ankle, continued swelling. continued pain to the dorsal foot. PT/HEP. Euflexxa #1 b/l knee 3/23/18: Euflexxa # 2 bilateral knees 3/28/18: Euflexxa # 3 bilateral knees 4/11/18 f/u garcia knees. Had god response to injections. Foot pain/burning persists. Request med r/n 5/2/18 f/u L ankle EMG pos for neuropathy 5/16/18 f/u L foot. Pain worse after packing up in [place] 6/6/18 f/u L foot sl improved 6/20/18: f/u L foot. Here to discuss emg results. Upper studies reported nl. L foot and ankle swelling imp eval started Neurontin 400 qid w improved symptoms 10/18/18 f/u L foot and ankle. Had increased pain this am 11/8/18 Euflexxa #1 garcia knees 11/16/18: euflexxa # 2 bilateral knees 11/23/18: euflexxa # 3 bilateral knees 2/22/19: f/u L ankle, continued intermittent pain that is more medial, tingling persists. Also lateral lumps  ankle 4/3/19 f/u L ankle Tingling persists. On Neurontin 600 6x/day, Cymbalta increasing 90 to 120 5/22/19 f/u L ankle HEP/PT Tolerating Cymbalta/Neurontin 1/8/20 f/u L ankle jarek medially. Burning pain persists 1/22/20 f/u L ankle Garcia knee Euflexxa #1 1/29/2020 f/u L ankle. Garcia knee Euflexxa #2 2/4/20 f/u LT ankle, B/L knee Euflexxa #3 6/10/20 f/u garcia knees Ant knee pain jarek stairs 7/17/20: Euflexxa #1 B/L knees 08/26/2022 pt is here to review the right knee mri results  7/24/20 Euflexxa #2 garcia knees 8/4/20: Euflexxa # 3 bilateral knees. better with stairs. 2/10/21 f/u garcia knees, had good response to injections Requ meds 2/17/21 Euflexxa #1 garcia knees 2/23/21: euflexxa # 2 garcia knees 3/2/21: euflexxa #3 bilateral knees. fell on the knees last night due to neuropathy 10/15/21: Pt here for fu on bilateral knees. There is continued pain. Euflexxa # 1 bilateral knees 10/22/21: bilateral knee Euflexxa #2 10/29/21: bilateral knee euflexxa #3 2/25/22: R lateral ankle pain and swelling since 12/2022. She denies injury. Pain [place]  Pain shoots into the lateral foot at times. She took Percocet the pain was so bad. Prior ankle fracture 2018 3/11/22: f/u R ankle to review the MRI. Symptoms the same. 3/17/22 f/u R ankle Leaving to [place] requ injection 6/30/22  f/uBil knees Euflexa #1; left knee worse 7/7/22  Euflexxa #2 garcia knee 7/15/22: Euflexxa # 3 bilateral knees  8/19/22: NI R knee twisting injury and fall on 8/17/22. Swelling and pain to the medial and anterior aspect of the knee. Pain with weightbearing.  8/26/22: f/u R knee to review the MRI. Right knee pain and swelling persists. She is using the brace 5/17/23  f/u garcia knees Had good response to visco in past.  Stairs challenging 5/26/23: f/u B/L knees; Euflexxa # 1 B/L knees  5/31/23: f/u B/L knees for Euflexxa #2, no new issues 6/7/23: f/u b/l knees Euflexxa #3  3/27/24  f/u Garcia knees  Had good response to last visco series 4/10/24: f/u garcia knees Euflexxa #1  [] : no [FreeTextEntry1] : B/L Knees [FreeTextEntry3] : N/A Chronic [FreeTextEntry5] : 62 y./o RHD F eval B/L Knees. Pt presents with many years if atraumatic pain due to  HX of OA. Prior TX of EUflexxa INJ with good relief. Euflexxa #1 today

## 2024-04-10 NOTE — PROCEDURE
[Large Joint Injection] : Large joint injection [Bilateral] : bilaterally of the [Knee] : knee [Pain] : pain [Inflammation] : inflammation [X-ray evidence of Osteoarthritis on this or prior visit] : x-ray evidence of Osteoarthritis on this or prior visit [Repeat series performed] : repeat series performed [Ethyl Chloride sprayed topically] : ethyl chloride sprayed topically [Sterile technique used] : sterile technique used [Euflexxa(20mg)] : 20mg of Euflexxa [#1] : series #1 [] : Patient tolerated procedure well [Call if redness, pain or fever occur] : call if redness, pain or fever occur [Apply ice for 15min out of every hour for the next 12-24 hours as tolerated] : apply ice for 15 minutes out of every hour for the next 12-24 hours as tolerated [Previous OTC use and PT nontherapeutic] : patient has tried OTC's including aspirin, Ibuprofen, Aleve, etc or prescription NSAIDS, and/or exercises at home and/or physical therapy without satisfactory response [Patient had decreased mobility in the joint] : patient had decreased mobility in the joint [Risks, benefits, alternatives discussed / Verbal consent obtained] : the risks benefits, and alternatives have been discussed, and verbal consent was obtained [Prior failure or difficult injection] : prior failure or difficult injection [Altered anatomic landmarks d/t erosive arthritis] : altered anatomic landmarks d/t erosive arthritis [All ultrasound images have been permanently captured and stored accordingly in our picture archiving and communication system] : All ultrasound images have been permanently captured and stored accordingly in our picture archiving and communication system [Visualization of the needle and placement of injection was performed without complication] : visualization of the needle and placement of injection was performed without complication

## 2024-04-17 ENCOUNTER — APPOINTMENT (OUTPATIENT)
Dept: ORTHOPEDIC SURGERY | Facility: CLINIC | Age: 64
End: 2024-04-17
Payer: COMMERCIAL

## 2024-04-17 VITALS — HEIGHT: 66 IN | WEIGHT: 219 LBS | BODY MASS INDEX: 35.2 KG/M2

## 2024-04-17 DIAGNOSIS — M17.11 UNILATERAL PRIMARY OSTEOARTHRITIS, RIGHT KNEE: ICD-10-CM

## 2024-04-17 PROCEDURE — 20611 DRAIN/INJ JOINT/BURSA W/US: CPT | Mod: 50

## 2024-04-17 PROCEDURE — 99212 OFFICE O/P EST SF 10 MIN: CPT | Mod: 25

## 2024-04-17 NOTE — HISTORY OF PRESENT ILLNESS
[1] : 2 [0] : 0 [Localized] : localized [Stabbing] : stabbing [Injection therapy] : injection therapy [Stairs] : stairs [Euflexxa] : Euflexxa [2] : 2 [de-identified] : Patient Complaint - NI garcia ankles. Fell down stairs 1/14/18. Seen @ [facility] in [place] in boot R a ankle. Prior ankle recon. 1/22/18: ORIF L joann 1/31/18: f/u L ankle, NWB splint, Some tingling and pain to the foot. 2/7/18: f/u L ankle, NWB in bivalved cast. Seen by Dr Copeland as cast was too tight. improved immediatel Dry skin and altered sensation to 3,4 toes. Had infusion 2/5/18. Clustrophobic 2/16/18: f/u L ankle, continued pain and claustrophobia in the cast 2/28/18: f/u L ankle, NWB in CAM boot, flying tomorrow. Noted oozing last night (was at show) No f/c. T 3/14/18: f/u L ankle, continued swelling. continued pain to the dorsal foot. PT/HEP. Euflexxa #1 b/l knee 3/23/18: Euflexxa # 2 bilateral knees 3/28/18: Euflexxa # 3 bilateral knees 4/11/18 f/u garcia knees. Had god response to injections. Foot pain/burning persists. Request med r/n 5/2/18 f/u L ankle EMG pos for neuropathy 5/16/18 f/u L foot. Pain worse after packing up in [place] 6/6/18 f/u L foot sl improved 6/20/18: f/u L foot. Here to discuss emg results. Upper studies reported nl. L foot and ankle swelling imp eval started Neurontin 400 qid w improved symptoms 10/18/18 f/u L foot and ankle. Had increased pain this am 11/8/18 Euflexxa #1 garcia knees 11/16/18: euflexxa # 2 bilateral knees 11/23/18: euflexxa # 3 bilateral knees 2/22/19: f/u L ankle, continued intermittent pain that is more medial, tingling persists. Also lateral lumps  ankle 4/3/19 f/u L ankle Tingling persists. On Neurontin 600 6x/day, Cymbalta increasing 90 to 120 5/22/19 f/u L ankle HEP/PT Tolerating Cymbalta/Neurontin 1/8/20 f/u L ankle jarek medially. Burning pain persists 1/22/20 f/u L ankle Garcia knee Euflexxa #1 1/29/2020 f/u L ankle. Garcia knee Euflexxa #2 2/4/20 f/u LT ankle, B/L knee Euflexxa #3 6/10/20 f/u garcia knees Ant knee pain jarek stairs 7/17/20: Euflexxa #1 B/L knees 08/26/2022 pt is here to review the right knee mri results  7/24/20 Euflexxa #2 garcia knees 8/4/20: Euflexxa # 3 bilateral knees. better with stairs. 2/10/21 f/u garcia knees, had good response to injections Requ meds 2/17/21 Euflexxa #1 garcia knees 2/23/21: euflexxa # 2 garcia knees 3/2/21: euflexxa #3 bilateral knees. fell on the knees last night due to neuropathy 10/15/21: Pt here for fu on bilateral knees. There is continued pain. Euflexxa # 1 bilateral knees 10/22/21: bilateral knee Euflexxa #2 10/29/21: bilateral knee euflexxa #3 2/25/22: R lateral ankle pain and swelling since 12/2022. She denies injury. [place]  Pain shoots into the lateral foot at times. She took Percocet the pain was so bad. Prior ankle fracture 2018 3/11/22: f/u R ankle to review the MRI. Symptoms the same. 3/17/22 f/u R ankle Leaving to [place] requ injection 6/30/22  f/uBil knees Euflexa #1; left knee worse 7/7/22  Euflexxa #2 garcia knee 7/15/22: Euflexxa # 3 bilateral knees  8/19/22: NI R knee twisting injury and fall on 8/17/22. Swelling and pain to the medial and anterior aspect of the knee. Pain with weightbearing.  8/26/22: f/u R knee to review the MRI. Right knee pain and swelling persists. She is using the brace 5/17/23  f/u garcia knees Had good response to visco in past.  Stairs challenging 5/26/23: f/u B/L knees; Euflexxa # 1 B/L knees  5/31/23: f/u B/L knees for Euflexxa #2, no new issues 6/7/23: f/u b/l knees Euflexxa #3  3/27/24  f/u Garcia knees  Had good response to last visco series 4/10/24: f/u garcia knees Euflexxa #1  4/17/24: f/u garcia knees Euflexxa #2 [] : no [FreeTextEntry1] : B/L Knees [FreeTextEntry3] : N/A Chronic [FreeTextEntry5] : 62 y./o RHD F eval B/L Knees. Pt presents with many years if atraumatic pain due to  HX of OA. Prior TX of EUflexxa INJ with good relief. Euflexxa #1 today  [de-identified] : 4-10-24 [de-identified] : knees

## 2024-04-17 NOTE — PROCEDURE
[Large Joint Injection] : Large joint injection [Bilateral] : bilaterally of the [Knee] : knee [Pain] : pain [Inflammation] : inflammation [X-ray evidence of Osteoarthritis on this or prior visit] : x-ray evidence of Osteoarthritis on this or prior visit [Repeat series performed] : repeat series performed [Alcohol] : alcohol [Betadine] : betadine [Ethyl Chloride sprayed topically] : ethyl chloride sprayed topically [Sterile technique used] : sterile technique used [Euflexxa(20mg)] : 20mg of Euflexxa [#2] : series #2 [] : Patient tolerated procedure well [Call if redness, pain or fever occur] : call if redness, pain or fever occur [Apply ice for 15min out of every hour for the next 12-24 hours as tolerated] : apply ice for 15 minutes out of every hour for the next 12-24 hours as tolerated [Previous OTC use and PT nontherapeutic] : patient has tried OTC's including aspirin, Ibuprofen, Aleve, etc or prescription NSAIDS, and/or exercises at home and/or physical therapy without satisfactory response [Patient had decreased mobility in the joint] : patient had decreased mobility in the joint [Risks, benefits, alternatives discussed / Verbal consent obtained] : the risks benefits, and alternatives have been discussed, and verbal consent was obtained [Prior failure or difficult injection] : prior failure or difficult injection [Altered anatomic landmarks d/t erosive arthritis] : altered anatomic landmarks d/t erosive arthritis [All ultrasound images have been permanently captured and stored accordingly in our picture archiving and communication system] : All ultrasound images have been permanently captured and stored accordingly in our picture archiving and communication system

## 2024-04-26 ENCOUNTER — APPOINTMENT (OUTPATIENT)
Dept: ORTHOPEDIC SURGERY | Facility: CLINIC | Age: 64
End: 2024-04-26
Payer: COMMERCIAL

## 2024-04-26 VITALS — HEIGHT: 66 IN | BODY MASS INDEX: 35.2 KG/M2 | WEIGHT: 219 LBS

## 2024-04-26 DIAGNOSIS — M25.812 OTHER SPECIFIED JOINT DISORDERS, LEFT SHOULDER: ICD-10-CM

## 2024-04-26 DIAGNOSIS — M17.12 UNILATERAL PRIMARY OSTEOARTHRITIS, LEFT KNEE: ICD-10-CM

## 2024-04-26 PROCEDURE — 20611 DRAIN/INJ JOINT/BURSA W/US: CPT | Mod: 50

## 2024-04-26 PROCEDURE — 99213 OFFICE O/P EST LOW 20 MIN: CPT | Mod: 25

## 2024-04-26 PROCEDURE — J3490M: CUSTOM

## 2024-04-26 NOTE — PROCEDURE
[Bilateral] : bilaterally of the [Knee] : knee [X-ray evidence of Osteoarthritis on this or prior visit] : x-ray evidence of Osteoarthritis on this or prior visit [Repeat series performed] : repeat series performed [Betadine] : betadine [Euflexxa(20mg)] : 20mg of Euflexxa [] : Patient tolerated procedure well [Call if redness, pain or fever occur] : call if redness, pain or fever occur [Apply ice for 15min out of every hour for the next 12-24 hours as tolerated] : apply ice for 15 minutes out of every hour for the next 12-24 hours as tolerated [Previous OTC use and PT nontherapeutic] : patient has tried OTC's including aspirin, Ibuprofen, Aleve, etc or prescription NSAIDS, and/or exercises at home and/or physical therapy without satisfactory response [Patient had decreased mobility in the joint] : patient had decreased mobility in the joint [#3] : series #3 [Large Joint Injection] : Large joint injection [Left] : of the left [Shoulder] : shoulder [Pain] : pain [Inflammation] : inflammation [Alcohol] : alcohol [Ethyl Chloride sprayed topically] : ethyl chloride sprayed topically [Sterile technique used] : sterile technique used [___ cc    6mg] :  Betamethasone (Celestone) ~Vcc of 6mg [___ cc    1%] : Lidocaine ~Vcc of 1%  [___ cc    0.5%] : Bupivacaine (Marcaine) ~Vcc of 0.5%  [Risks, benefits, alternatives discussed / Verbal consent obtained] : the risks benefits, and alternatives have been discussed, and verbal consent was obtained [All ultrasound images have been permanently captured and stored accordingly in our picture archiving and communication system] : All ultrasound images have been permanently captured and stored accordingly in our picture archiving and communication system [Visualization of the needle and placement of injection was performed without complication] : visualization of the needle and placement of injection was performed without complication

## 2024-04-26 NOTE — HISTORY OF PRESENT ILLNESS
[1] : 2 [0] : 0 [Localized] : localized [Stabbing] : stabbing [Injection therapy] : injection therapy [Stairs] : stairs [3] : 3 [Euflexxa] : Euflexxa [de-identified] : Patient Complaint - NI garcia ankles. Fell down stairs 1/14/18. Seen @ [facility] in [place] in boot R a ankle. Prior ankle recon. 1/22/18: ORIF L joann 1/31/18: f/u L ankle, NWB splint, Some tingling and pain to the foot. 2/7/18: f/u L ankle, NWB in bivalved cast. Seen by Dr Copeland as cast was too tight. improved immediatel Dry skin and altered sensation to 3,4 toes. Had infusion 2/5/18. Clustrophobic 2/16/18: f/u L ankle, continued pain and claustrophobia in the cast 2/28/18: f/u L ankle, NWB in CAM boot, flying tomorrow. Noted oozing last night (was at show) No f/c. T 3/14/18: f/u L ankle, continued swelling. continued pain to the dorsal foot. PT/HEP. Euflexxa #1 b/l knee 3/23/18: Euflexxa # 2 bilateral knees 3/28/18: Euflexxa # 3 bilateral knees 4/11/18 f/u garcia knees. Had god response to injections. Foot pain/burning persists. Request med r/n 5/2/18 f/u L ankle EMG pos for neuropathy 5/16/18 f/u L foot. Pain worse after packing up in [place] 6/6/18 f/u L foot sl improved 6/20/18: f/u L foot. Here to discuss emg results. Upper studies reported nl. L foot and ankle swelling imp eval started Neurontin 400 qid w improved symptoms 10/18/18 f/u L foot and ankle. Had increased pain this am 11/8/18 Euflexxa #1 garcia knees 11/16/18: euflexxa # 2 bilateral knees 11/23/18: euflexxa # 3 bilateral knees 2/22/19: f/u L ankle, continued intermittent pain that is more medial, tingling persists. Also lateral lumps  ankle 4/3/19 f/u L ankle Tingling persists. On Neurontin 600 6x/day, Cymbalta increasing 90 to 120 5/22/19 f/u L ankle HEP/PT Tolerating Cymbalta/Neurontin 1/8/20 f/u L ankle jarek medially. Burning pain persists 1/22/20 f/u L ankle Garcia knee Euflexxa #1 1/29/2020 f/u L ankle. Garcia knee Euflexxa #2 2/4/20 f/u LT ankle, B/L knee Euflexxa #3 6/10/20 f/u garcia knees Ant knee pain jarek stairs 7/17/20: Euflexxa #1 B/L knees 08/26/2022 pt is here to review the right knee mri results  7/24/20 Euflexxa #2 garcia knees 8/4/20: Euflexxa # 3 bilateral knees. better with stairs. 2/10/21 f/u garcia knees, had good response to injections Requ meds 2/17/21 Euflexxa #1 garcia knees 2/23/21: euflexxa # 2 garcia knees 3/2/21: euflexxa #3 bilateral knees. fell on the knees last night due to neuropathy 10/15/21: Pt here for fu on bilateral knees. There is continued pain. Euflexxa # 1 bilateral knees 10/22/21: bilateral knee Euflexxa #2 10/29/21: bilateral knee euflexxa #3 2/25/22: R lateral ankle pain and swelling since 12/2022. She denies injury. [place]  Pain shoots into the lateral foot at times. She took Percocet the pain was so bad. Prior ankle fracture 2018 3/11/22: f/u R ankle to review the MRI. Symptoms the same. 3/17/22 f/u R ankle Leaving to [place] requ injection 6/30/22  f/uBil knees Euflexa #1; left knee worse 7/7/22  Euflexxa #2 garcia knee 7/15/22: Euflexxa # 3 bilateral knees  8/19/22: NI R knee twisting injury and fall on 8/17/22. Swelling and pain to the medial and anterior aspect of the knee. Pain with weightbearing.  8/26/22: f/u R knee to review the MRI. Right knee pain and swelling persists. She is using the brace 5/17/23  f/u garcia knees Had good response to visco in past.  Stairs challenging 5/26/23: f/u B/L knees; Euflexxa # 1 B/L knees  5/31/23: f/u B/L knees for Euflexxa #2, no new issues 6/7/23: f/u b/l knees Euflexxa #3  3/27/24  f/u Garcia knees  Had good response to last visco series 4/10/24: f/u garcia knees Euflexxa #1  4/17/24: f/u garcia knees Euflexxa #2 4/26/24: f/u garcia knees Euflexxa #3 and requesting L shoulder injection [] : no [FreeTextEntry1] : B/L Knees [FreeTextEntry3] : N/A Chronic [FreeTextEntry5] : 62 y./o RHD F eval B/L Knees. Pt presents with many years if atraumatic pain due to  HX of OA. Prior TX of EUflexxa INJ with good relief. Euflexxa #1 today  [de-identified] : knees

## 2024-04-26 NOTE — PHYSICAL EXAM
[Right] : right knee [NL (0)] : extension 0 degrees [Left] : left shoulder [5 ___] : forward flexion 5[unfilled]/5 [5___] : internal rotation 5[unfilled]/5 [] : non-antalgic [FreeTextEntry3] : anterior knee abrasion healed\par  medial swelling [TWNoteComboBox7] : flexion 110 degrees

## 2024-05-15 ENCOUNTER — TRANSCRIPTION ENCOUNTER (OUTPATIENT)
Age: 64
End: 2024-05-15

## 2024-05-15 RX ORDER — CEVIMELINE HYDROCHLORIDE 30 MG/1
30 CAPSULE ORAL
Qty: 90 | Refills: 1 | Status: ACTIVE | COMMUNITY
Start: 2019-07-25 | End: 1900-01-01

## 2024-06-04 ENCOUNTER — TRANSCRIPTION ENCOUNTER (OUTPATIENT)
Age: 64
End: 2024-06-04

## 2024-07-29 ENCOUNTER — RX RENEWAL (OUTPATIENT)
Age: 64
End: 2024-07-29

## 2024-07-29 RX ORDER — AZELASTINE HYDROCHLORIDE 137 UG/1
137 SPRAY, METERED NASAL
Qty: 3 | Refills: 1 | Status: ACTIVE | COMMUNITY
Start: 2024-07-29 | End: 1900-01-01

## 2024-07-30 ENCOUNTER — TRANSCRIPTION ENCOUNTER (OUTPATIENT)
Age: 64
End: 2024-07-30

## 2024-08-10 NOTE — HISTORY OF PRESENT ILLNESS
Presented with sudden onset aphasia and left sided weakness      -CT head:  There is 4.2 x 3.8 cm (AP x T) intraparenchymal hemorrhage centered in the right thalamocapsular region series 2 image 36. There is mild surrounding edema with resultant effacement of the adjacent sulci and Sylvian fissure, as well as compression of the right lateral ventricle with 8mm midline shift on series 2 image 36.  Intraventricular extension of bleed is seen in the right lateral ventricle and 3rd ventricle. There is mild dilatation of the left lateral ventricle compared to the right suggesting possible early obstructive hydrocephalus component. Sulcal hyperdensities are seen along the right temporal lobe reflective of subarachnoid hemorrhage. Recommend continued serial interval follow-up to full resolution as indicated.  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 notify neurology of any neuro change  -strict blood pressure control ... -150 for the first 24 hours, then SBP less than 140  -avoid antiplatelet or anticoagulation at this time  -seizure precautions ... notify neurology of any seizure-like activity  -therapy evaluations tomorrow   -neurosurgery consulted     [TextBox_4] : Ms. Dickinson is a 62 year old female with history of asthma, allergies, GERD, JOSHUA, PNA history, poor sleep, postnasal drip, sicca, sleep disordered breathing, and shortness of breath who now presents via video for an acute visit.   Her chief complaint is sinus congestion.   Patient reports for 2 days she is experiencing sinus congestion, sinus pressure, post nasal drip, dry cough and slight wheezing. She state she had COVID test which was negative. Patient states she is currently taking levalbuterol inhaler only.   Patient states she needs refill on Symbicort. She reports she has nebulizer and solution.  Patient denies fever, chills, SOB @ rest, or heartburn/reflux.

## 2024-09-06 ENCOUNTER — APPOINTMENT (OUTPATIENT)
Dept: PULMONOLOGY | Facility: CLINIC | Age: 64
End: 2024-09-06

## 2024-09-06 ENCOUNTER — APPOINTMENT (OUTPATIENT)
Dept: PULMONOLOGY | Facility: CLINIC | Age: 64
End: 2024-09-06
Payer: COMMERCIAL

## 2024-09-06 VITALS — WEIGHT: 219 LBS | BODY MASS INDEX: 35.2 KG/M2 | HEIGHT: 66 IN

## 2024-09-06 DIAGNOSIS — U07.1 COVID-19: ICD-10-CM

## 2024-09-06 DIAGNOSIS — J45.909 UNSPECIFIED ASTHMA, UNCOMPLICATED: ICD-10-CM

## 2024-09-06 DIAGNOSIS — R06.02 SHORTNESS OF BREATH: ICD-10-CM

## 2024-09-06 DIAGNOSIS — K21.9 GASTRO-ESOPHAGEAL REFLUX DISEASE W/OUT ESOPHAGITIS: ICD-10-CM

## 2024-09-06 DIAGNOSIS — M35.00 SICCA SYNDROME, UNSPECIFIED: ICD-10-CM

## 2024-09-06 DIAGNOSIS — J45.40 MODERATE PERSISTENT ASTHMA, UNCOMPLICATED: ICD-10-CM

## 2024-09-06 DIAGNOSIS — G47.33 OBSTRUCTIVE SLEEP APNEA (ADULT) (PEDIATRIC): ICD-10-CM

## 2024-09-06 PROCEDURE — 99214 OFFICE O/P EST MOD 30 MIN: CPT

## 2024-09-06 RX ORDER — PREDNISONE 10 MG/1
10 TABLET ORAL
Qty: 50 | Refills: 0 | Status: ACTIVE | COMMUNITY
Start: 2024-09-06 | End: 1900-01-01

## 2024-09-06 NOTE — HISTORY OF PRESENT ILLNESS
[Home] : at home, [unfilled] , at the time of the visit. [Medical Office: ([facility])___] : at the medical office located in  [Verbal consent obtained from patient] : the patient, [unfilled] [FreeTextEntry1] : Ms. Dickinson is a 63 year old female presenting to the office via video call for a follow up visit for asthma, allergies, GERD, JOSHUA, PNA history, poor sleep, postnasal drip, sicca, sleep disordered breathing, and shortness of breath. Her chief complaint is   -she notes recent cold Sx - positive for COVID-19 7 days ago (9/2024) -she notes cough for past several months -she notes coughing in supine position at night -she notes rare cough induced emesis (more sputum) -she notes ulcer colitis has returned  -she notes using Breztri, as well as nebulizer  -she notes using Astelin for sinuses -she notes sinuses have improved since last week when positive for COVID-19 -she notes this is her third time having COVID-19    -she denies any headaches, nausea, emesis, fever, chills, sweats, chest pain, chest pressure, wheezing, palpitations, diarrhea, constipation, dysphagia, vertigo, arthralgias, myalgias, leg swelling, itchy eyes, itchy ears, heartburn, reflux, or sour taste in the mouth.

## 2024-09-06 NOTE — REASON FOR VISIT
[Follow-Up] : a follow-up visit [FreeTextEntry1] :  via video call: asthma, allergies, GERD, JOSHUA, PNA history, poor sleep, postnasal drip, sicca, sleep disordered breathing, and shortness of breath

## 2024-09-06 NOTE — ASSESSMENT
[FreeTextEntry1] : Ms. Dickinson is 64 yo has a h/o asthma, iron deficiency anemia, allergy, GERS, OSAS, s/p PNA, SLE / Sicca. ?Dx of SLE; s/p COVID-19 x3 - Active SLE ([place] / [place]); untreated JOSHUA (DD) -  Active Asthma / reflux; post COVID-19; Active Asthma  Her shortness of breath is multifactorial due to: -obesity -out of shape -asthma -? CAD -poor breathing mechanics -(Anemia)  problem 1: asthma (active) - post COVID-19 8/2024 -add Prednisone 20 mg x 7 days, 10 mg x 7 days -continue to use Singulair 10 mg before bed -continue Xopenex 1.25 with the nebulizer TID -continue Ventolin rescue inhaler 2 inhalations before exercise, Q6H -Breztri 2 puffs BID -Asthma is believed to be caused by inherited (genetic) and environmental factor, but its exact cause is unknown. Asthma may be triggered by allergens, lung infections, or irritants in the air. Asthma triggers are different for each person -Inhaler technique reviewed as well as oral hygiene techniques reviewed with patient. Avoidance of cold air, extremes of temperature, rescue inhaler should be used before exercise. Order of medication reviewed with patient. Recommended use of a cool mist humidifier in the bedroom.  Problem 1A: Acute Bronchitis 8/2023 (resolved) -s/p Prednisone 20 mg x 7 days, 10 mg x 7 days  -s/p Promethazine DM [facility] Information sheet given to the patient to be reviewed, this medication is never to be used without consulting the prescribing physician. Proper dietary restraint is necessary specifically salt containing foods, if any reaction may occur should be reported. You have a clinical scenario most c/q acute bronchitis the etiology of which is unknown but empiric antibiotics are indicated. Hydration, mucolytics including mucinex, robitussin and the like are indicated. Cough controlling agents will be needed.  problem 1B: Fe Anemia - PRN -f/u Dr. Boyce for infusion  problem 2: allergic rhinitis (active) -add Xyzal 5 mg QHS - Recommended Navage saline -followed by Flonase 1 sniff each nostril BID -followed by Olopatadine 1 sniff each nostril (0.6) or Astelin 0.15% 1 sniff/nostril BID  -can use Boroleum ointment PRN -X-lear QD PRN -Environmental measures for allergies were encouraged including mattress and pillow cover, air purifier, and environmental controls.  problem 3: GERD -continue to use Dexilant 60 mg BID (before meals) - [facility] -continue Pepcid 40 mg before bed -restart use of Carafate PRN -discussed Rule of 2's; pt should avoid eating too much; too fast; too spicy; too lousy; less than two hours before bed -Things to avoid including overeating, spicy foods, tight clothing, eating within three hours of bed, this list is not all inclusive. -For treatment of reflux, possible options discussed including diet control, H2 blockers, PPIs, as well as coating motility agents discussed as treatment options. Timing of meals and proximity of last meal to sleep were discussed. If symptoms persist, a formal gastrointestinal evaluation is needed.  problem 4: obesity (improved) -Recommend "Muniq" OTC Supplement for weight loss, energy levels, and blood sugar levels -Weight loss, exercise, and diet control were discussed and are highly encouraged. Treatment options were given such as, aqua therapy, and contacting a nutritionist. Recommended to use the elliptical, stationary bike, less use of treadmill. Obesity is associated with worsening asthma, shortness of breath, and potential for cardiac disease, diabetes, and other underlying medical conditions.  problem 5: mild JOSHUA- (refit) -recommended eXciteOSA therapy device -s/p HSS - move to HSS if needed -dental device (Osbaldo) -her recent sleep study found mild OSAS- recommended a Dental Device, the names Dr. Roblero is recommended for her based on her location -Sleep apnea is associated with adverse clinical consequences which an affect most organ systems. Cardiovascular disease risk includes arrhythmias, atrial fibrillation, hypertension, coronary artery disease, and stroke. Metabolic disorders include diabetes type 2, non-alcoholic fatty liver disease. Mood disorder especially depression; and cognitive decline especially in the elderly. Associations with chronic reflux/Chavis's esophagus some but not all inclusive. -Reasons include arousal consistent with hypopnea; respiratory events most prominent in REM sleep or supine position; therefore sleep staging and body position are important for accurate diagnosis and estimation of AHI. -Treatment options discussed including CPAP/BiPAP machine, oral appliance, ProVent therapy, Oxy-Aid by Respitec, new technologies, or positional sleep.Recommended use of the CPAP machine for moderate (AHI >15), moderate to severe (AHI 15-30) and severe patients (AHI > 30). Recommended weight loss which can reduce AHI especially in weight loss of greater than 5% of BMI. Positional sleep is recommended in those with low AHI, low-moderate MBI, and younger age. For severe sleep apnea, the hypoglossal nerve stimulator was recommended as well.  problem 6: poor sleep - Recommended either Dr. Herbie Roblero or Dr. Yamila Albarran for a dental device vs. "Excite", Epsom salt bath -recommending Sleep Guard -recommending pt wear sunglasses 30min before bed -Good sleep hygiene was encouraged including avoiding watching television an hour before bed, keeping caffeine at a low, avoiding reading, television, or anything, in bed, no drinking any liquids three hours before bedtime, and only getting into bed when tired and ready for sleep  problem 7: sicca -recommended to use Mouth Kote / Evoxac 30 mg QAM  Problem 8: Health Maintenance/COVID19 Precautions -s/p Covid-19 vaccine Pfizer x4 - Clean your hands often. Wash your hands often with soap and water for at least 20 seconds, especially after blowing your nose, coughing, or sneezing, or having been in a public place. - If soap and water are not available, use a hand  that contains at least 60% alcohol. - To the extent possible, avoid touching high-touch surfaces in public places - elevator buttons, door handles, handrails, handshaking with people, etc. Use a tissue or your sleeve to cover your hand or finger if you must touch something. - Wash your hands after touching surfaces in public places. - Avoid touching your face, nose, eyes, etc. - Clean and disinfect your home to remove germs: practice routine cleaning of frequently touched surfaces (for example: tables, doorknobs, light switches, handles, desks, toilets, faucets, sinks & cell phones) - Avoid crowds, especially in poorly ventilated spaces. Your risk of exposure to respiratory viruses like COVID-19 may increase in crowded, closed-in settings with little air circulation if there are people in the crowd who are sick. All patients are recommended to practice social distancing and stay at least 6 feet away from others. - Avoid all non-essential travel including plane trips, and especially avoid embarking on cruise ships. -If COVID-19 is spreading in your community, take extra measures to put distance between yourself and other people to further reduce your risk of being exposed to this new virus. -Stay home as much as possible. - Consider ways of getting food brought to your house through family, social, or commercial networks -Be aware that the virus has been known to live in the air up to 3 hours post exposure, cardboard up to 24 hours post exposure, copper up to 4 hours post exposure, steel and plastic up to 2-3 days post exposure. Risk of transmission from these surfaces are affected by many variables. COVID-19 precautionary Immune Support Recommendations: -OTC Vitamin C 500mg BID -OTC Quercetin 250-500mg BID -OTC Zinc 75-100mg per day -OTC Melatonin 1 or 2mg a night -OTC Vitamin D 1-4000mg per day -OTC Tonic Water 8oz per day -Water 0.5-1 gallon per day Asthma and COVID19: You need to make sure your asthma is under control. This often requires the use of inhaled corticosteroids (and sometimes oral corticosteroids). Inhaled corticosteroids do not likely reduce your immune system's ability to fight infections, but oral corticosteroids may. It is important to use the steps above to protect yourself to limit your exposure to any respiratory virus.  Problem 9:health maintenance -recommended RSV vaccine -Sauna Detox -Recommended Functional medicine follow up with Dr. Sejal Myrick -s/p flu shot 2022 -recommended strep pneumonia vaccines: Prevnar-13 vaccine, followed by Pneumo vaccine 23 one year following -recommended early intervention for URIs -recommended regular osteoporosis evaluations -recommended early dermatological evaluations -recommended after the age of 50 to the age of 70, colonoscopy every 5 years F/U in 4 months She is encouraged to call with any changes, concerns, or questions.

## 2024-09-06 NOTE — ADDENDUM
[FreeTextEntry1] : Documented by Hunter Severino acting as a scribe for Dr. Len Gonzalez on 09/06/2024 All medical record entries made by the Scribe were at my, Dr. Len Gonzalez's, direction and personally dictated by me on 09/06/2024 . I have reviewed the chart and agree that the record accurately reflects my personal performance of the history, physical exam, assessment and plan. I have also personally directed, reviewed, and agree with the discharge instructions.

## 2024-10-02 ENCOUNTER — RX RENEWAL (OUTPATIENT)
Age: 64
End: 2024-10-02

## 2024-10-05 ENCOUNTER — RX RENEWAL (OUTPATIENT)
Age: 64
End: 2024-10-05

## 2024-10-08 ENCOUNTER — RX RENEWAL (OUTPATIENT)
Age: 64
End: 2024-10-08

## 2024-10-10 ENCOUNTER — RX RENEWAL (OUTPATIENT)
Age: 64
End: 2024-10-10

## 2024-12-12 ENCOUNTER — NON-APPOINTMENT (OUTPATIENT)
Age: 64
End: 2024-12-12

## 2025-01-13 ENCOUNTER — RX RENEWAL (OUTPATIENT)
Age: 65
End: 2025-01-13

## 2025-01-29 ENCOUNTER — APPOINTMENT (OUTPATIENT)
Dept: ORTHOPEDIC SURGERY | Facility: CLINIC | Age: 65
End: 2025-01-29

## 2025-01-29 ENCOUNTER — APPOINTMENT (OUTPATIENT)
Dept: ORTHOPEDIC SURGERY | Facility: CLINIC | Age: 65
End: 2025-01-29
Payer: COMMERCIAL

## 2025-01-29 VITALS — HEIGHT: 66 IN | BODY MASS INDEX: 35.2 KG/M2 | WEIGHT: 219 LBS

## 2025-01-29 DIAGNOSIS — M79.89 OTHER SPECIFIED SOFT TISSUE DISORDERS: ICD-10-CM

## 2025-01-29 DIAGNOSIS — M23.91 UNSPECIFIED INTERNAL DERANGEMENT OF RIGHT KNEE: ICD-10-CM

## 2025-01-29 DIAGNOSIS — Z98.890 OTHER SPECIFIED POSTPROCEDURAL STATES: ICD-10-CM

## 2025-01-29 DIAGNOSIS — M17.11 UNILATERAL PRIMARY OSTEOARTHRITIS, RIGHT KNEE: ICD-10-CM

## 2025-01-29 DIAGNOSIS — M17.12 UNILATERAL PRIMARY OSTEOARTHRITIS, LEFT KNEE: ICD-10-CM

## 2025-01-29 PROCEDURE — 99213 OFFICE O/P EST LOW 20 MIN: CPT

## 2025-01-29 PROCEDURE — 73610 X-RAY EXAM OF ANKLE: CPT | Mod: LT

## 2025-01-29 RX ORDER — OXYCODONE AND ACETAMINOPHEN 10; 325 MG/1; MG/1
10-325 TABLET ORAL
Qty: 15 | Refills: 0 | Status: ACTIVE | COMMUNITY
Start: 2025-01-29 | End: 1900-01-01

## 2025-02-04 ENCOUNTER — RX RENEWAL (OUTPATIENT)
Age: 65
End: 2025-02-04

## 2025-02-12 ENCOUNTER — RESULT REVIEW (OUTPATIENT)
Age: 65
End: 2025-02-12

## 2025-02-13 ENCOUNTER — APPOINTMENT (OUTPATIENT)
Dept: PULMONOLOGY | Facility: CLINIC | Age: 65
End: 2025-02-13

## 2025-02-14 ENCOUNTER — APPOINTMENT (OUTPATIENT)
Dept: ORTHOPEDIC SURGERY | Facility: CLINIC | Age: 65
End: 2025-02-14
Payer: COMMERCIAL

## 2025-02-14 DIAGNOSIS — Z98.890 OTHER SPECIFIED POSTPROCEDURAL STATES: ICD-10-CM

## 2025-02-14 DIAGNOSIS — M79.604 PAIN IN RIGHT LEG: ICD-10-CM

## 2025-02-14 DIAGNOSIS — M23.91 UNSPECIFIED INTERNAL DERANGEMENT OF RIGHT KNEE: ICD-10-CM

## 2025-02-14 DIAGNOSIS — M71.21 SYNOVIAL CYST OF POPLITEAL SPACE [BAKER], RIGHT KNEE: ICD-10-CM

## 2025-02-14 PROCEDURE — 99213 OFFICE O/P EST LOW 20 MIN: CPT

## 2025-02-14 PROCEDURE — 99214 OFFICE O/P EST MOD 30 MIN: CPT

## 2025-02-14 PROCEDURE — 73562 X-RAY EXAM OF KNEE 3: CPT | Mod: RT

## 2025-02-18 ENCOUNTER — RESULT REVIEW (OUTPATIENT)
Age: 65
End: 2025-02-18

## 2025-02-21 NOTE — ASSESSMENT
[FreeTextEntry1] : 63F p/w Lumbar DDD, radiculopathy, troch bursitis  f.u MRI L spine MDP for pain L GT CSI tolerated well return after imaging   The patient was advised of the diagnosis. The natural history of the pathology was explained in full to the patient in layman's terms. All questions were answered. The risks and benefits of surgical and non-surgical treatment alternatives were explained in full to the patient.    The risks, benefits, contents and alternatives to injection were explained in full to the patient. Risks outlined include but are not limited to infection, sepsis, bleeding, scarring, skin discoloration, temporary increase in pain, syncopal episode, failure to resolve symptoms, allergic reaction, flare reaction, permanent white skin discoloration, symptom recurrence, and elevation of blood sugar in diabetics. Patient understood the risks. All questions were answered. After discussion of options, patient requested an injection. Oral informed consent was obtained and sterile prep was done of the injection site. Sterile technique was used to introduce the mixture. Patient tolerated the procedure well. Patient advised to ice the injection site this evening. Signs and symptoms of infection reviewed and patient advised to call immediately for redness, fevers, and/or chills.    Female

## 2025-02-25 ENCOUNTER — NON-APPOINTMENT (OUTPATIENT)
Age: 65
End: 2025-02-25

## 2025-02-26 ENCOUNTER — APPOINTMENT (OUTPATIENT)
Dept: ORTHOPEDIC SURGERY | Facility: CLINIC | Age: 65
End: 2025-02-26
Payer: COMMERCIAL

## 2025-02-26 VITALS — HEIGHT: 66 IN | BODY MASS INDEX: 35.2 KG/M2 | WEIGHT: 219 LBS

## 2025-02-26 DIAGNOSIS — M71.21 SYNOVIAL CYST OF POPLITEAL SPACE [BAKER], RIGHT KNEE: ICD-10-CM

## 2025-02-26 DIAGNOSIS — M17.11 UNILATERAL PRIMARY OSTEOARTHRITIS, RIGHT KNEE: ICD-10-CM

## 2025-02-26 PROCEDURE — 99213 OFFICE O/P EST LOW 20 MIN: CPT

## 2025-04-02 ENCOUNTER — APPOINTMENT (OUTPATIENT)
Dept: ORTHOPEDIC SURGERY | Facility: CLINIC | Age: 65
End: 2025-04-02
Payer: COMMERCIAL

## 2025-04-02 VITALS — BODY MASS INDEX: 35.2 KG/M2 | HEIGHT: 66 IN | WEIGHT: 219 LBS

## 2025-04-02 PROCEDURE — 20611 DRAIN/INJ JOINT/BURSA W/US: CPT | Mod: 50

## 2025-04-02 PROCEDURE — 99212 OFFICE O/P EST SF 10 MIN: CPT | Mod: 25

## 2025-04-09 ENCOUNTER — APPOINTMENT (OUTPATIENT)
Dept: ORTHOPEDIC SURGERY | Facility: CLINIC | Age: 65
End: 2025-04-09
Payer: COMMERCIAL

## 2025-04-09 ENCOUNTER — RX RENEWAL (OUTPATIENT)
Age: 65
End: 2025-04-09

## 2025-04-09 VITALS — BODY MASS INDEX: 35.2 KG/M2 | HEIGHT: 66 IN | WEIGHT: 219 LBS

## 2025-04-09 PROCEDURE — 99212 OFFICE O/P EST SF 10 MIN: CPT | Mod: 25

## 2025-04-09 PROCEDURE — 20611 DRAIN/INJ JOINT/BURSA W/US: CPT | Mod: 50

## 2025-04-16 ENCOUNTER — NON-APPOINTMENT (OUTPATIENT)
Age: 65
End: 2025-04-16

## 2025-04-17 ENCOUNTER — NON-APPOINTMENT (OUTPATIENT)
Age: 65
End: 2025-04-17

## 2025-04-18 ENCOUNTER — APPOINTMENT (OUTPATIENT)
Dept: ORTHOPEDIC SURGERY | Facility: CLINIC | Age: 65
End: 2025-04-18
Payer: COMMERCIAL

## 2025-04-18 ENCOUNTER — APPOINTMENT (OUTPATIENT)
Dept: PULMONOLOGY | Facility: CLINIC | Age: 65
End: 2025-04-18
Payer: COMMERCIAL

## 2025-04-18 ENCOUNTER — NON-APPOINTMENT (OUTPATIENT)
Age: 65
End: 2025-04-18

## 2025-04-18 VITALS
SYSTOLIC BLOOD PRESSURE: 122 MMHG | TEMPERATURE: 97.1 F | BODY MASS INDEX: 32.95 KG/M2 | HEART RATE: 100 BPM | HEIGHT: 66 IN | RESPIRATION RATE: 16 BRPM | OXYGEN SATURATION: 97 % | WEIGHT: 205 LBS | DIASTOLIC BLOOD PRESSURE: 70 MMHG

## 2025-04-18 DIAGNOSIS — K21.9 GASTRO-ESOPHAGEAL REFLUX DISEASE W/OUT ESOPHAGITIS: ICD-10-CM

## 2025-04-18 DIAGNOSIS — J45.40 MODERATE PERSISTENT ASTHMA, UNCOMPLICATED: ICD-10-CM

## 2025-04-18 DIAGNOSIS — G47.33 OBSTRUCTIVE SLEEP APNEA (ADULT) (PEDIATRIC): ICD-10-CM

## 2025-04-18 DIAGNOSIS — R06.02 SHORTNESS OF BREATH: ICD-10-CM

## 2025-04-18 DIAGNOSIS — M23.91 UNSPECIFIED INTERNAL DERANGEMENT OF RIGHT KNEE: ICD-10-CM

## 2025-04-18 DIAGNOSIS — R09.82 POSTNASAL DRIP: ICD-10-CM

## 2025-04-18 PROCEDURE — 94729 DIFFUSING CAPACITY: CPT

## 2025-04-18 PROCEDURE — 94727 GAS DIL/WSHOT DETER LNG VOL: CPT

## 2025-04-18 PROCEDURE — 94010 BREATHING CAPACITY TEST: CPT

## 2025-04-18 PROCEDURE — 20611 DRAIN/INJ JOINT/BURSA W/US: CPT | Mod: 50

## 2025-04-18 PROCEDURE — 95012 NITRIC OXIDE EXP GAS DETER: CPT

## 2025-04-18 PROCEDURE — 99214 OFFICE O/P EST MOD 30 MIN: CPT | Mod: 25

## 2025-04-18 PROCEDURE — 99212 OFFICE O/P EST SF 10 MIN: CPT | Mod: 25

## 2025-04-18 RX ORDER — DICLOFENAC SODIUM 10 MG/G
1 GEL TOPICAL
Qty: 1 | Refills: 0 | Status: ACTIVE | COMMUNITY
Start: 2025-04-18 | End: 1900-01-01

## 2025-04-25 ENCOUNTER — APPOINTMENT (OUTPATIENT)
Dept: ORTHOPEDIC SURGERY | Facility: CLINIC | Age: 65
End: 2025-04-25
Payer: COMMERCIAL

## 2025-04-25 DIAGNOSIS — M17.11 UNILATERAL PRIMARY OSTEOARTHRITIS, RIGHT KNEE: ICD-10-CM

## 2025-04-25 DIAGNOSIS — M17.12 UNILATERAL PRIMARY OSTEOARTHRITIS, LEFT KNEE: ICD-10-CM

## 2025-04-25 PROCEDURE — 20611 DRAIN/INJ JOINT/BURSA W/US: CPT | Mod: RT

## 2025-04-25 PROCEDURE — 99213 OFFICE O/P EST LOW 20 MIN: CPT | Mod: 25

## 2025-04-25 PROCEDURE — J3490M: CUSTOM

## 2025-04-28 ENCOUNTER — RX RENEWAL (OUTPATIENT)
Age: 65
End: 2025-04-28

## 2025-04-30 ENCOUNTER — APPOINTMENT (OUTPATIENT)
Dept: ORTHOPEDIC SURGERY | Facility: CLINIC | Age: 65
End: 2025-04-30

## 2025-06-13 ENCOUNTER — APPOINTMENT (OUTPATIENT)
Dept: ORTHOPEDIC SURGERY | Facility: CLINIC | Age: 65
End: 2025-06-13
Payer: COMMERCIAL

## 2025-06-13 VITALS — BODY MASS INDEX: 32.95 KG/M2 | HEIGHT: 66 IN | WEIGHT: 205 LBS

## 2025-06-13 PROCEDURE — J3490M: CUSTOM

## 2025-06-13 PROCEDURE — 20611 DRAIN/INJ JOINT/BURSA W/US: CPT | Mod: RT

## 2025-06-13 PROCEDURE — 99213 OFFICE O/P EST LOW 20 MIN: CPT | Mod: 25

## 2025-08-14 ENCOUNTER — RX RENEWAL (OUTPATIENT)
Age: 65
End: 2025-08-14

## 2025-09-03 ENCOUNTER — APPOINTMENT (OUTPATIENT)
Dept: ORTHOPEDIC SURGERY | Facility: CLINIC | Age: 65
End: 2025-09-03